# Patient Record
Sex: MALE | Race: WHITE | NOT HISPANIC OR LATINO | Employment: OTHER | URBAN - METROPOLITAN AREA
[De-identification: names, ages, dates, MRNs, and addresses within clinical notes are randomized per-mention and may not be internally consistent; named-entity substitution may affect disease eponyms.]

---

## 2017-02-09 ENCOUNTER — CONVERSION ENCOUNTER (OUTPATIENT)
Dept: FAMILY MEDICINE CLINIC | Facility: CLINIC | Age: 72
End: 2017-02-09

## 2017-02-20 ENCOUNTER — CONVERSION ENCOUNTER (OUTPATIENT)
Dept: FAMILY MEDICINE CLINIC | Facility: CLINIC | Age: 72
End: 2017-02-20

## 2017-03-21 ENCOUNTER — HOSPITAL ENCOUNTER (OUTPATIENT)
Dept: CARDIOLOGY | Facility: HOSPITAL | Age: 72
Discharge: HOME | End: 2017-03-21
Attending: INTERNAL MEDICINE | Admitting: INTERNAL MEDICINE

## 2017-08-29 ENCOUNTER — HOSPITAL ENCOUNTER (OUTPATIENT)
Dept: MAMMOGRAPHY | Facility: HOSPITAL | Age: 72
Discharge: HOME | End: 2017-08-29
Attending: INTERNAL MEDICINE | Admitting: INTERNAL MEDICINE

## 2017-11-13 ENCOUNTER — HOSPITAL ENCOUNTER (OUTPATIENT)
Dept: LAB | Facility: HOSPITAL | Age: 72
Discharge: HOME | End: 2017-11-13
Attending: INTERNAL MEDICINE | Admitting: INTERNAL MEDICINE

## 2017-11-13 LAB
ALBUMIN SERPL-MCNC: 4.9 G/DL (ref 3.5–4.8)
ALBUMIN/GLOB SERPL: 2.1 {RATIO} (ref 1–1.7)
ALP SERPL-CCNC: 44 IU/L (ref 32–91)
ALT SERPL-CCNC: 30 IU/L (ref 17–63)
ANION GAP SERPL CALC-SCNC: 15.9 MMOL/L (ref 10–20)
AST SERPL-CCNC: 37 IU/L (ref 15–41)
BASOPHILS # BLD AUTO: 0.1 10*3/UL (ref 0–0.2)
BASOPHILS NFR BLD AUTO: 1 % (ref 0–2)
BILIRUB SERPL-MCNC: 0.9 MG/DL (ref 0.3–1.2)
BUN SERPL-MCNC: 12 MG/DL (ref 8–20)
BUN/CREAT SERPL: 9.2 (ref 6.2–20.3)
CALCIUM SERPL-MCNC: 9.9 MG/DL (ref 8.9–10.3)
CHLORIDE SERPL-SCNC: 102 MMOL/L (ref 101–111)
CONV CO2: 28 MMOL/L (ref 22–32)
CONV TOTAL PROTEIN: 7.2 G/DL (ref 6.1–7.9)
CREAT UR-MCNC: 1.3 MG/DL (ref 0.7–1.2)
DIFFERENTIAL METHOD BLD: (no result)
EOSINOPHIL # BLD AUTO: 0.1 10*3/UL (ref 0–0.3)
EOSINOPHIL # BLD AUTO: 1 % (ref 0–3)
ERYTHROCYTE [DISTWIDTH] IN BLOOD BY AUTOMATED COUNT: 13.9 % (ref 11.5–14.5)
GLOBULIN UR ELPH-MCNC: 2.3 G/DL (ref 2.5–3.8)
GLUCOSE SERPL-MCNC: 118 MG/DL (ref 65–99)
HCT VFR BLD AUTO: 41.8 % (ref 40–54)
HGB BLD-MCNC: 14.2 G/DL (ref 14–18)
LYMPHOCYTES # BLD AUTO: 3.6 10*3/UL (ref 0.8–4.8)
LYMPHOCYTES NFR BLD AUTO: 37 % (ref 18–42)
MAGNESIUM SERPL-MCNC: 2.2 MG/DL (ref 1.8–2.5)
MCH RBC QN AUTO: 33.2 PG (ref 26–32)
MCHC RBC AUTO-ENTMCNC: 33.9 G/DL (ref 32–36)
MCV RBC AUTO: 98 FL (ref 80–94)
MONOCYTES # BLD AUTO: 0.5 10*3/UL (ref 0.1–1.3)
MONOCYTES NFR BLD AUTO: 5 % (ref 2–11)
NEUTROPHILS # BLD AUTO: 5.5 10*3/UL (ref 2.3–8.6)
NEUTROPHILS NFR BLD AUTO: 56 % (ref 50–75)
NRBC BLD AUTO-RTO: 0 /100{WBCS}
NRBC/RBC NFR BLD MANUAL: 0 10*3/UL
PLATELET # BLD AUTO: 360 10*3/UL (ref 150–450)
PMV BLD AUTO: 8.4 FL (ref 7.4–10.4)
POTASSIUM SERPL-SCNC: 4.9 MMOL/L (ref 3.6–5.1)
RBC # BLD AUTO: 4.27 10*6/UL (ref 4.6–6)
SODIUM SERPL-SCNC: 141 MMOL/L (ref 136–144)
TSH SERPL-ACNC: 1.52 UIU/ML (ref 0.34–5.6)
WBC # BLD AUTO: 9.8 10*3/UL (ref 4.5–11.5)

## 2018-03-08 ENCOUNTER — HOSPITAL ENCOUNTER (OUTPATIENT)
Dept: LAB | Facility: HOSPITAL | Age: 73
Discharge: HOME | End: 2018-03-08
Attending: INTERNAL MEDICINE | Admitting: INTERNAL MEDICINE

## 2018-05-31 ENCOUNTER — HOSPITAL ENCOUNTER (OUTPATIENT)
Dept: GENERAL RADIOLOGY | Facility: HOSPITAL | Age: 73
Discharge: HOME | End: 2018-05-31
Attending: INTERNAL MEDICINE | Admitting: INTERNAL MEDICINE

## 2018-05-31 ENCOUNTER — HOSPITAL ENCOUNTER (OUTPATIENT)
Dept: LAB | Facility: HOSPITAL | Age: 73
Discharge: HOME | End: 2018-05-31
Attending: INTERNAL MEDICINE | Admitting: INTERNAL MEDICINE

## 2018-05-31 LAB
ALBUMIN SERPL-MCNC: 4 G/DL (ref 3.5–4.8)
ALBUMIN/GLOB SERPL: 1.7 {RATIO} (ref 1–1.7)
ALP SERPL-CCNC: 50 IU/L (ref 32–91)
ALT SERPL-CCNC: 22 IU/L (ref 17–63)
ANION GAP SERPL CALC-SCNC: 12.1 MMOL/L (ref 10–20)
AST SERPL-CCNC: 25 IU/L (ref 15–41)
BILIRUB SERPL-MCNC: 0.6 MG/DL (ref 0.3–1.2)
BUN SERPL-MCNC: 10 MG/DL (ref 8–20)
BUN/CREAT SERPL: 8.3 (ref 6.2–20.3)
CALCIUM SERPL-MCNC: 9 MG/DL (ref 8.9–10.3)
CHLORIDE SERPL-SCNC: 107 MMOL/L (ref 101–111)
CONV CO2: 25 MMOL/L (ref 22–32)
CONV MACROCYTES IN BLOOD BY LIGHT MICROSCOPY: SLIGHT
CONV TOTAL PROTEIN: 6.4 G/DL (ref 6.1–7.9)
CREAT UR-MCNC: 1.2 MG/DL (ref 0.7–1.2)
CRP SERPL-MCNC: 0.24 MG/DL (ref 0–0.7)
DIFFERENTIAL METHOD BLD: (no result)
EOSINOPHIL # BLD AUTO: 0.1 10*3/UL (ref 0–0.3)
EOSINOPHIL # BLD AUTO: 1 % (ref 0–3)
ERYTHROCYTE [DISTWIDTH] IN BLOOD BY AUTOMATED COUNT: 15.8 % (ref 11.5–14.5)
ERYTHROCYTE [SEDIMENTATION RATE] IN BLOOD BY WESTERGREN METHOD: 20 MM/HR (ref 0–20)
GLOBULIN UR ELPH-MCNC: 2.4 G/DL (ref 2.5–3.8)
GLUCOSE SERPL-MCNC: 128 MG/DL (ref 65–99)
HCT VFR BLD AUTO: 39.2 % (ref 40–54)
HGB BLD-MCNC: 12.8 G/DL (ref 14–18)
LYMPHOCYTES # BLD AUTO: 3.2 10*3/UL (ref 0.8–4.8)
LYMPHOCYTES NFR BLD AUTO: 37 % (ref 18–42)
MCH RBC QN AUTO: 32.8 PG (ref 26–32)
MCHC RBC AUTO-ENTMCNC: 32.7 G/DL (ref 32–36)
MCV RBC AUTO: 100.2 FL (ref 80–94)
MONOCYTES # BLD AUTO: 0.3 10*3/UL (ref 0.1–1.3)
MONOCYTES NFR BLD AUTO: 4 % (ref 2–11)
NEUTROPHILS # BLD AUTO: 5.1 10*3/UL (ref 2.3–8.6)
NEUTROPHILS NFR BLD AUTO: 58 % (ref 50–75)
PLATELET # BLD AUTO: 318 10*3/UL (ref 150–450)
PMV BLD AUTO: 8.5 FL (ref 7.4–10.4)
POTASSIUM SERPL-SCNC: 4.1 MMOL/L (ref 3.6–5.1)
RBC # BLD AUTO: 3.91 10*6/UL (ref 4.6–6)
SODIUM SERPL-SCNC: 140 MMOL/L (ref 136–144)
URATE SERPL-MCNC: 4.9 MG/DL (ref 4.8–8.7)
WBC # BLD AUTO: 8.7 10*3/UL (ref 4.5–11.5)

## 2018-09-11 ENCOUNTER — HOSPITAL ENCOUNTER (OUTPATIENT)
Dept: ULTRASOUND IMAGING | Facility: HOSPITAL | Age: 73
Discharge: HOME | End: 2018-09-11
Attending: INTERNAL MEDICINE | Admitting: INTERNAL MEDICINE

## 2018-09-11 LAB
ANION GAP SERPL CALC-SCNC: 13.1 MMOL/L (ref 10–20)
BILIRUB UR QL STRIP: NEGATIVE MG/DL
BUN SERPL-MCNC: 14 MG/DL (ref 8–20)
BUN/CREAT SERPL: 10 (ref 6.2–20.3)
CALCIUM SERPL-MCNC: 9.1 MG/DL (ref 8.9–10.3)
CASTS URNS QL MICRO: NORMAL /[LPF]
CHLORIDE SERPL-SCNC: 102 MMOL/L (ref 101–111)
COLOR UR: YELLOW
CONV BACTERIA IN URINE MICRO: NEGATIVE
CONV CLARITY OF URINE: CLEAR
CONV CO2: 28 MMOL/L (ref 22–32)
CONV HYALINE CASTS IN URINE MICRO: 0 /[LPF] (ref 0–5)
CONV PROTEIN IN URINE BY AUTOMATED TEST STRIP: NEGATIVE MG/DL
CONV SMALL ROUND CELLS: NORMAL /[HPF]
CONV UROBILINOGEN IN URINE BY AUTOMATED TEST STRIP: 0.2 MG/DL
CREAT 24H UR-MCNC: 36.4 MG/DL
CREAT UR-MCNC: 1.4 MG/DL (ref 0.7–1.2)
CULTURE INDICATED?: NORMAL
GLUCOSE SERPL-MCNC: 95 MG/DL (ref 65–99)
GLUCOSE UR QL: NEGATIVE MG/DL
HGB UR QL STRIP: NEGATIVE
KETONES UR QL STRIP: NEGATIVE MG/DL
LEUKOCYTE ESTERASE UR QL STRIP: NEGATIVE
NITRITE UR QL STRIP: NEGATIVE
PH UR STRIP.AUTO: 6.5 [PH] (ref 4.5–8)
POTASSIUM SERPL-SCNC: 3.1 MMOL/L (ref 3.6–5.1)
PROT UR-MCNC: 2 MG/DL
PROT/CREAT UR: 0.1 MG/MG (ref 0–22)
RBC #/AREA URNS HPF: 1 /[HPF] (ref 0–3)
SODIUM SERPL-SCNC: 140 MMOL/L (ref 136–144)
SP GR UR: 1.01 (ref 1–1.03)
SPERM URNS QL MICRO: NORMAL /[HPF]
SQUAMOUS SPT QL MICRO: 0 /[HPF] (ref 0–5)
UNIDENT CRYS URNS QL MICRO: NORMAL /[HPF]
WBC #/AREA URNS HPF: 1 /[HPF] (ref 0–5)
YEAST SPEC QL WET PREP: NORMAL /[HPF]

## 2018-09-13 ENCOUNTER — HOSPITAL ENCOUNTER (OUTPATIENT)
Dept: CARDIOLOGY | Facility: HOSPITAL | Age: 73
Discharge: HOME | End: 2018-09-13
Attending: INTERNAL MEDICINE | Admitting: INTERNAL MEDICINE

## 2018-09-14 LAB — PROT PATTERN SERPL IFE-IMP: NORMAL

## 2018-10-31 ENCOUNTER — HOSPITAL ENCOUNTER (OUTPATIENT)
Dept: LAB | Facility: HOSPITAL | Age: 73
Discharge: HOME | End: 2018-10-31
Attending: INTERNAL MEDICINE | Admitting: INTERNAL MEDICINE

## 2018-10-31 LAB
ANION GAP SERPL CALC-SCNC: 14.1 MMOL/L (ref 10–20)
BILIRUB UR QL STRIP: NEGATIVE MG/DL
BUN SERPL-MCNC: 20 MG/DL (ref 8–20)
BUN/CREAT SERPL: 13.3 (ref 6.2–20.3)
CALCIUM SERPL-MCNC: 9.1 MG/DL (ref 8.9–10.3)
CASTS URNS QL MICRO: NORMAL /[LPF]
CHLORIDE SERPL-SCNC: 101 MMOL/L (ref 101–111)
COLOR UR: YELLOW
CONV BACTERIA IN URINE MICRO: NEGATIVE
CONV CLARITY OF URINE: CLEAR
CONV CO2: 28 MMOL/L (ref 22–32)
CONV HYALINE CASTS IN URINE MICRO: 0 /[LPF] (ref 0–5)
CONV PROTEIN IN URINE BY AUTOMATED TEST STRIP: NEGATIVE MG/DL
CONV SMALL ROUND CELLS: NORMAL /[HPF]
CONV UROBILINOGEN IN URINE BY AUTOMATED TEST STRIP: 0.2 MG/DL
CREAT UR-MCNC: 1.5 MG/DL (ref 0.7–1.2)
CULTURE INDICATED?: NORMAL
GLUCOSE SERPL-MCNC: 136 MG/DL (ref 65–99)
GLUCOSE UR QL: NEGATIVE MG/DL
HGB UR QL STRIP: NEGATIVE
KETONES UR QL STRIP: NEGATIVE MG/DL
LEUKOCYTE ESTERASE UR QL STRIP: NEGATIVE
NITRITE UR QL STRIP: NEGATIVE
PH UR STRIP.AUTO: 6 [PH] (ref 4.5–8)
POTASSIUM SERPL-SCNC: 4.1 MMOL/L (ref 3.6–5.1)
RBC #/AREA URNS HPF: 0 /[HPF] (ref 0–3)
SODIUM SERPL-SCNC: 139 MMOL/L (ref 136–144)
SP GR UR: 1.01 (ref 1–1.03)
SPERM URNS QL MICRO: NORMAL /[HPF]
SQUAMOUS SPT QL MICRO: 0 /[HPF] (ref 0–5)
UNIDENT CRYS URNS QL MICRO: NORMAL /[HPF]
WBC #/AREA URNS HPF: 0 /[HPF] (ref 0–5)
YEAST SPEC QL WET PREP: NORMAL /[HPF]

## 2019-01-28 ENCOUNTER — HOSPITAL ENCOUNTER (OUTPATIENT)
Dept: CARDIOLOGY | Facility: HOSPITAL | Age: 74
Discharge: HOME | End: 2019-01-28
Attending: INTERNAL MEDICINE | Admitting: INTERNAL MEDICINE

## 2019-05-13 ENCOUNTER — HOSPITAL ENCOUNTER (OUTPATIENT)
Dept: LAB | Facility: HOSPITAL | Age: 74
Discharge: HOME | End: 2019-05-13
Attending: INTERNAL MEDICINE | Admitting: INTERNAL MEDICINE

## 2019-05-13 LAB
ANION GAP SERPL CALC-SCNC: 15.6 MMOL/L (ref 10–20)
BUN SERPL-MCNC: 16 MG/DL (ref 8–20)
BUN/CREAT SERPL: 11.4 (ref 6.2–20.3)
CALCIUM SERPL-MCNC: 8.9 MG/DL (ref 8.9–10.3)
CHLORIDE SERPL-SCNC: 99 MMOL/L (ref 101–111)
CONV CO2: 23 MMOL/L (ref 22–32)
CREAT UR-MCNC: 1.4 MG/DL (ref 0.7–1.2)
GLUCOSE SERPL-MCNC: 117 MG/DL (ref 65–99)
POTASSIUM SERPL-SCNC: 3.6 MMOL/L (ref 3.6–5.1)
SODIUM SERPL-SCNC: 134 MMOL/L (ref 136–144)

## 2019-05-24 ENCOUNTER — HOSPITAL ENCOUNTER (OUTPATIENT)
Dept: PREADMISSION TESTING | Facility: HOSPITAL | Age: 74
Discharge: HOME | End: 2019-05-24
Attending: INTERNAL MEDICINE | Admitting: INTERNAL MEDICINE

## 2019-05-24 ENCOUNTER — CONVERSION ENCOUNTER (OUTPATIENT)
Dept: URGENT CARE | Facility: CLINIC | Age: 74
End: 2019-05-24

## 2019-05-24 LAB
ALBUMIN SERPL-MCNC: 3.7 G/DL (ref 3.5–4.8)
ALBUMIN/GLOB SERPL: 1.3 {RATIO} (ref 1–1.7)
ALP SERPL-CCNC: 56 IU/L (ref 32–91)
ALT SERPL-CCNC: 20 IU/L (ref 17–63)
ANION GAP SERPL CALC-SCNC: 18.5 MMOL/L (ref 10–20)
AST SERPL-CCNC: 26 IU/L (ref 15–41)
BILIRUB SERPL-MCNC: 1 MG/DL (ref 0.3–1.2)
BUN SERPL-MCNC: 10 MG/DL (ref 8–20)
BUN/CREAT SERPL: 8.3 (ref 6.2–20.3)
CALCIUM SERPL-MCNC: 8.9 MG/DL (ref 8.9–10.3)
CHLORIDE SERPL-SCNC: 94 MMOL/L (ref 101–111)
CONV ABS OTHER: 0.1 10*3/UL
CONV ANISOCYTES: SLIGHT
CONV CO2: 24 MMOL/L (ref 22–32)
CONV TOTAL PROTEIN: 6.6 G/DL (ref 6.1–7.9)
CREAT UR-MCNC: 1.2 MG/DL (ref 0.7–1.2)
DIFFERENTIAL METHOD BLD: (no result)
ERYTHROCYTE [DISTWIDTH] IN BLOOD BY AUTOMATED COUNT: 14.7 % (ref 11.5–14.5)
GLOBULIN UR ELPH-MCNC: 2.9 G/DL (ref 2.5–3.8)
GLUCOSE SERPL-MCNC: 125 MG/DL (ref 65–99)
HCT VFR BLD AUTO: 32.9 % (ref 40–54)
HGB BLD-MCNC: 10.9 G/DL (ref 14–18)
IMMATURE CELLS: 1 %
INR PPP: 1.1
LYMPHOCYTES # BLD AUTO: 4.7 10*3/UL (ref 0.8–4.8)
LYMPHOCYTES NFR BLD AUTO: 47 % (ref 18–42)
MAGNESIUM SERPL-MCNC: 1.8 MG/DL (ref 1.8–2.5)
MCH RBC QN AUTO: 32.8 PG (ref 26–32)
MCHC RBC AUTO-ENTMCNC: 33.1 G/DL (ref 32–36)
MCV RBC AUTO: 99.1 FL (ref 80–94)
MONOCYTES # BLD AUTO: 0.3 10*3/UL (ref 0.1–1.3)
MONOCYTES NFR BLD AUTO: 3 % (ref 2–11)
NEUTROPHILS # BLD AUTO: 4.9 10*3/UL (ref 2.3–8.6)
NEUTROPHILS NFR BLD AUTO: 49 % (ref 50–75)
PLATELET # BLD AUTO: 397 10*3/UL (ref 150–450)
PMV BLD AUTO: 7.9 FL (ref 7.4–10.4)
POTASSIUM SERPL-SCNC: 3.5 MMOL/L (ref 3.6–5.1)
PROTHROMBIN TIME: 11 SEC (ref 9.6–11.7)
RBC # BLD AUTO: 3.32 10*6/UL (ref 4.6–6)
SODIUM SERPL-SCNC: 133 MMOL/L (ref 136–144)
TSH SERPL-ACNC: 5.74 UIU/ML (ref 0.34–5.6)
WBC # BLD AUTO: 10 10*3/UL (ref 4.5–11.5)

## 2019-06-03 ENCOUNTER — CONVERSION ENCOUNTER (OUTPATIENT)
Dept: FAMILY MEDICINE CLINIC | Facility: CLINIC | Age: 74
End: 2019-06-03

## 2019-06-03 VITALS
HEART RATE: 99 BPM | DIASTOLIC BLOOD PRESSURE: 83 MMHG | SYSTOLIC BLOOD PRESSURE: 127 MMHG | OXYGEN SATURATION: 99 % | HEART RATE: 70 BPM | BODY MASS INDEX: 27.26 KG/M2 | WEIGHT: 190 LBS | OXYGEN SATURATION: 95 % | BODY MASS INDEX: 28.05 KG/M2 | WEIGHT: 195.5 LBS | SYSTOLIC BLOOD PRESSURE: 146 MMHG | DIASTOLIC BLOOD PRESSURE: 73 MMHG

## 2019-06-04 VITALS
OXYGEN SATURATION: 92 % | BODY MASS INDEX: 27.49 KG/M2 | DIASTOLIC BLOOD PRESSURE: 65 MMHG | HEART RATE: 69 BPM | HEIGHT: 70 IN | WEIGHT: 192 LBS | RESPIRATION RATE: 18 BRPM | SYSTOLIC BLOOD PRESSURE: 129 MMHG

## 2019-06-04 VITALS
OXYGEN SATURATION: 95 % | BODY MASS INDEX: 28.12 KG/M2 | WEIGHT: 196.4 LBS | HEIGHT: 70 IN | SYSTOLIC BLOOD PRESSURE: 150 MMHG | RESPIRATION RATE: 20 BRPM | HEART RATE: 62 BPM | DIASTOLIC BLOOD PRESSURE: 81 MMHG

## 2019-06-06 NOTE — PROGRESS NOTES
Visit Type:  Follow-up Visit  Referring Provider:  Aysha  Primary Provider:  Bertha Wang MD      History of Present Illness:  Post pacemaker upgrade came for incision evaluation incision is clean and pacemaker function within normal limits   polypharmacy addressed   reducing  isosorbide to 60 mg a day  .  Nifedipine to be stopped   patient to continue on losartan 50 mg a day   amiodarone was recently stopped   patient feels much better      Vital Signs:    Patient Profile:    74 Years Old Male  Height:     70 inches (177.80 cm)  Weight:     192 pounds  BMI:        27.55     O2 Sat:     92 %  Pulse rate: 69 / minute  Resp:       18 per minute  BP Sittin / 65  (right arm)    Cuff size:  regular      Problems: Active problems were reviewed with the patient during this visit.  Medications: Medications were reviewed with the patient during this visit.  Allergies: Allergies were reviewed with the patient during this visit.  No Known Allergy.        Vitals Entered By: Kim Muñoz RN (Dedra  3, 2019 8:25 AM)    Active Medications (reviewed today):  LOSARTAN POTASSIUM 25 MG ORAL TABLET (LOSARTAN POTASSIUM)   NIFEDIPINE ER 30 MG ORAL TABLET EXTENDED RELEASE 24 HOUR (NIFEDIPINE)   OLMESARTAN MEDOXOMIL 20 MG ORAL TABLET (OLMESARTAN MEDOXOMIL) Take one (1) tablet by mouth daily.  PROCARDIA XL 30 MG ORAL TABLET EXTENDED RELEASE 24 HOUR (NIFEDIPINE) Take one (1) tablet by mouth daily.  METOPROLOL SUCCINATE ER 50 MG ORAL TABLET EXTENDED RELEASE 24 HOUR (METOPROLOL SUCCINATE) Take 1 tablet by mouth daily  FUROSEMIDE 40 MG ORAL TABLET (FUROSEMIDE) Take 1 tablet by mouth daily  AMIODARONE  MG ORAL TABLET (AMIODARONE HCL) Take one (1) tablet by mouth daily.  CLOPIDOGREL BISULFATE 75 MG ORAL TABLET (CLOPIDOGREL BISULFATE) One by mouth daily  KLOR-CON 10 10 MEQ ORAL TABLET EXTENDED RELEASE (POTASSIUM CHLORIDE) Take 1 tablet by mouth daily  COLCHICINE 0.6 MG ORAL TABLET (COLCHICINE) 1 TAB PO BID  ALLOPURINOL 300 MG ORAL  TABLET (ALLOPURINOL) Take 1 tablet by mouth daily  FLOMAX 0.4 MG ORAL CAPSULE (TAMSULOSIN HCL) Take 1 tablet by mouth daily at bedtime  VITAMIN C TABLET (ASCORBIC ACID TABS) 1000mg twice daily by mouth  VITAMIN B-6 100 MG ORAL TABLET (PYRIDOXINE HCL) Take one by mouth daily  B-12 500 MCG ORAL TABLET (CYANOCOBALAMIN) Take 1 tablet by mouth daily  ATORVASTATIN CALCIUM 20 MG ORAL TABLET (ATORVASTATIN CALCIUM) Take 1 tablet by mouth daily at bedtime  RANITIDINE  MG ORAL CAPSULE (RANITIDINE HCL) twice daily  ISOSORBIDE MONONITRATE  MG ORAL TABLET EXTENDED RELEASE 24 HOUR (ISOSORBIDE MONONITRATE) Take 1 tablet by mouth daily  RANEXA 1000 MG ORAL TABLET EXTENDED RELEASE 12 HOUR (RANOLAZINE) Take one (1) tablet by mouth twice a day  QUETIAPINE FUMARATE 300 MG ORAL TABLET (QUETIAPINE FUMARATE) Take 2 tablets by mouth daily  MIRTAZAPINE 30 MG ORAL TABLET (MIRTAZAPINE) Take one (1) tablet by mouth twice a day  NITROSTAT 0.4 MG SUBLINGUAL TABLET SUBLINGUAL (NITROGLYCERIN) as directed  GERITOL COMPLETE ORAL TABLET (IRON-VITAMINS) Take 1 tablet by mouth daily    Current Allergies (reviewed today):  No known allergies      Past Medical History:     Reviewed history from 05/24/2019 and no changes required:        Hypertension        Coronary Artery Disease: S/P CABG        Hyperlipidemia        paroxysmal atrial fibrillation        Valvular Heart Disease: S/P MVR         Leukemia with lymphoma         SSS: S/P Pacemaker implantation         Bronchitis         Myocardial Infarction: slight July 2016        prostate    Past Surgical History:     Reviewed history from 01/22/2019 and no changes required:        C A B G:        Mitral Valve Replacement: 12/2002        Pacemaker: Biotronik Dual chamber implanted 11/7/2011        Right arm surgery for nerve problem-2013        Prostate surgery : March 2016        Left breast biopsy: May 2016        Cardiac Cath: 7-25-16 at [facility] : 10-5-2018 at [facility]         LIDIA 11/2017        EP  study 11/2017        Social History:     Reviewed history from 05/24/2019 and no changes required:        Passive Smoke: N        Alcohol Use: Y        Drug Use: N        HIV/High Risk: N        Regular Exercise: Y        Hx Domestic Abuse: N        Ev[employer] Affecting Care: N        Alcohol Use: Y                positive flu vaccine 2018                Smoking History: quit in year 2000        Patient is a former smoker.        Risk Factors:     Smoked Tobacco Use:  Former smoker     Cigarettes:  Yes -- .5 pack(s) per day,    Pack-years:  15 years        Year started:  age 28        Year quit:  2000        Years Since Last Quit:  19  Smokeless Tobacco Use:  Never  Passive smoke exposure:  no  Drug use:  no  HIV high-risk behavior:  no  Caffeine use:  3 drinks per day  Alcohol use:  no  Exercise:  yes     Times per week:  7     Type of Exercise:  sit ups, push up - walk a mile daily  Seatbelt use:  100 %  Sun Exposure:  frequently    Family History Risk Factors:     Family History of MI in females < 65 years old:  no     Family History of MI in males < 55 years old:  no    Previous Tobacco Use: Signed On - 05/24/2019  Smoked Tobacco Use:  Former smoker     Cigarettes:  Yes -- .5 pack(s) per day,    Pack-years:  15 years        Year started:  age 28        Year quit:  2000        Years Since Last Quit:  19 years, 5 months, 2 days  Smokeless Tobacco Use:  Never  Passive smoke exposure:  no  Drug use:  no  HIV high-risk behavior:  no  Caffeine use:  3 drinks per day    Previous Alcohol Use: Signed On - 05/24/2019  Alcohol use:  no  Exercise:  yes     Times per week:  7     Type of Exercise:  sit ups, push up - walk a mile daily  Seatbelt use:  100 %  Sun Exposure:  frequently    Family History Risk Factors:     Family History of MI in females < 65 years old:  no     Family History of MI in males < 55 years old:  no    Colonoscopy History:     Date of Last Colonoscopy:  07/01/2011        Review of Systems   General: No  fatigue or tiredness, No change in weight   Eyes: No redness  Cardiovascular: No chest pain, no palpitations  Respiratory: No shortness of breath  Gastrointestinal: No nausea or vomiting, bleeding  Genitourinary: no hematuria or dysuria  Musculoskeletal: No arthralgia or myalgia  Skin: No rash  Neurologic: No numbness, tingling, syncope  Hematologic/Lymphatic: No abnormal bleeding        Blood Pressure:  Today's BP: 129/65 mm Hg            Medications Added to Medication List This Visit:  1)  Losartan Potassium 50 Mg Oral Tablet (Losartan potassium) .... Take one (1) tablet by mouth once a day  2)  Metoprolol Succinate Er 25 Mg Oral Tablet Extended Release 24 Hour (Metoprolol succinate) .... 1 tab daily.  3)  Furosemide 40 Mg Oral Tablet (Furosemide) .... Take 2 tablets by mouth daily  4)  Quetiapine Fumarate 300 Mg Oral Tablet (Quetiapine fumarate) .... Take 1/2 tablets by mouth daily  5)  Mirtazapine 30 Mg Oral Tablet (Mirtazapine) .... Take 1/2 tablet by mouth twice a day                  Medication Administration    Orders Added:  1)  Post-op Visit [30194]  ]      Electronically signed by Luis Nieto MD on 06/03/2019 at 12:01 PM  ________________________________________________________________________       Disclaimer: Converted Note message may not contain all data elements that existed in the [employer] source system. Please see [employer] for the original note details.

## 2019-06-06 NOTE — PROGRESS NOTES
Visit Type:  Acute Visit  Primary Care Provider:  Bertha Wang MD    Chief Complaint:  cough.    History of Present Illness:  cough and nata after mowing 6 acres of grass  says  cough withe tickle in the throat  eyes watery  and runny nose  no allergy meds  concerned       Vital Signs:    Patient Profile:    74 Years Old Male  Height:     70 inches (177.80 cm)  Weight:     196.4 pounds  BMI:        28.18     O2 Sat:     95 %  Temp:       98 degrees F oral  Pulse rate: 62 / minute  Resp:       20 per minute  BP Sittin / 81  (right arm)    Cuff size:  regular      Problems: Active problems were reviewed with the patient during this visit.  Medications: Medications were reviewed with the patient during this visit.  Allergies: Allergies were reviewed with the patient during this visit.  No Known Allergy.  No Known Drug Allergy.        Vitals Entered By: Lisa Chester (May 24, 2019 9:12 AM)    Active Medications (reviewed today):  OLMESARTAN MEDOXOMIL 20 MG ORAL TABLET (OLMESARTAN MEDOXOMIL) Take one (1) tablet by mouth daily.  PROCARDIA XL 30 MG ORAL TABLET EXTENDED RELEASE 24 HOUR (NIFEDIPINE) Take one (1) tablet by mouth daily.  METOPROLOL SUCCINATE ER 50 MG ORAL TABLET EXTENDED RELEASE 24 HOUR (METOPROLOL SUCCINATE) Take 1 tablet by mouth daily  FUROSEMIDE 40 MG ORAL TABLET (FUROSEMIDE) Take 1 tablet by mouth daily  AMIODARONE  MG ORAL TABLET (AMIODARONE HCL) Take one (1) tablet by mouth daily.  CLOPIDOGREL BISULFATE 75 MG ORAL TABLET (CLOPIDOGREL BISULFATE) One by mouth daily  KLOR-CON 10 10 MEQ ORAL TABLET EXTENDED RELEASE (POTASSIUM CHLORIDE) Take 1 tablet by mouth daily  COLCHICINE 0.6 MG ORAL TABLET (COLCHICINE) 1 TAB PO BID  ALLOPURINOL 300 MG ORAL TABLET (ALLOPURINOL) Take 1 tablet by mouth daily  FLOMAX 0.4 MG ORAL CAPSULE (TAMSULOSIN HCL) Take 1 tablet by mouth daily at bedtime  VITAMIN C TABLET (ASCORBIC ACID TABS) 1000mg twice daily by mouth  VITAMIN B-6 100 MG ORAL TABLET (PYRIDOXINE HCL) Take  one by mouth daily  B-12 500 MCG ORAL TABLET (CYANOCOBALAMIN) Take 1 tablet by mouth daily  ATORVASTATIN CALCIUM 20 MG ORAL TABLET (ATORVASTATIN CALCIUM) Take 1 tablet by mouth daily at bedtime  RANITIDINE  MG ORAL CAPSULE (RANITIDINE HCL) twice daily  ISOSORBIDE MONONITRATE  MG ORAL TABLET EXTENDED RELEASE 24 HOUR (ISOSORBIDE MONONITRATE) Take 1 tablet by mouth daily  RANEXA 1000 MG ORAL TABLET EXTENDED RELEASE 12 HOUR (RANOLAZINE) Take one (1) tablet by mouth twice a day  QUETIAPINE FUMARATE 300 MG ORAL TABLET (QUETIAPINE FUMARATE) Take 2 tablets by mouth daily  MIRTAZAPINE 30 MG ORAL TABLET (MIRTAZAPINE) Take one (1) tablet by mouth twice a day  NITROSTAT 0.4 MG SUBLINGUAL TABLET SUBLINGUAL (NITROGLYCERIN) as directed  GERITOL COMPLETE ORAL TABLET (IRON-VITAMINS) Take 1 tablet by mouth daily    Current Allergies (reviewed today):  No known allergies    Current Medications (including medications started today):   PREDNISONE 50 MG ORAL TABLET (PREDNISONE) 1 by mouth once a day  LOSARTAN POTASSIUM 25 MG ORAL TABLET (LOSARTAN POTASSIUM)   NIFEDIPINE ER 30 MG ORAL TABLET EXTENDED RELEASE 24 HOUR (NIFEDIPINE)   OLMESARTAN MEDOXOMIL 20 MG ORAL TABLET (OLMESARTAN MEDOXOMIL) Take one (1) tablet by mouth daily.  PROCARDIA XL 30 MG ORAL TABLET EXTENDED RELEASE 24 HOUR (NIFEDIPINE) Take one (1) tablet by mouth daily.  METOPROLOL SUCCINATE ER 50 MG ORAL TABLET EXTENDED RELEASE 24 HOUR (METOPROLOL SUCCINATE) Take 1 tablet by mouth daily  FUROSEMIDE 40 MG ORAL TABLET (FUROSEMIDE) Take 1 tablet by mouth daily  AMIODARONE  MG ORAL TABLET (AMIODARONE HCL) Take one (1) tablet by mouth daily.  CLOPIDOGREL BISULFATE 75 MG ORAL TABLET (CLOPIDOGREL BISULFATE) One by mouth daily  KLOR-CON 10 10 MEQ ORAL TABLET EXTENDED RELEASE (POTASSIUM CHLORIDE) Take 1 tablet by mouth daily  COLCHICINE 0.6 MG ORAL TABLET (COLCHICINE) 1 TAB PO BID  ALLOPURINOL 300 MG ORAL TABLET (ALLOPURINOL) Take 1 tablet by mouth daily  FLOMAX 0.4  MG ORAL CAPSULE (TAMSULOSIN HCL) Take 1 tablet by mouth daily at bedtime  VITAMIN C TABLET (ASCORBIC ACID TABS) 1000mg twice daily by mouth  VITAMIN B-6 100 MG ORAL TABLET (PYRIDOXINE HCL) Take one by mouth daily  B-12 500 MCG ORAL TABLET (CYANOCOBALAMIN) Take 1 tablet by mouth daily  ATORVASTATIN CALCIUM 20 MG ORAL TABLET (ATORVASTATIN CALCIUM) Take 1 tablet by mouth daily at bedtime  RANITIDINE  MG ORAL CAPSULE (RANITIDINE HCL) twice daily  ISOSORBIDE MONONITRATE  MG ORAL TABLET EXTENDED RELEASE 24 HOUR (ISOSORBIDE MONONITRATE) Take 1 tablet by mouth daily  RANEXA 1000 MG ORAL TABLET EXTENDED RELEASE 12 HOUR (RANOLAZINE) Take one (1) tablet by mouth twice a day  QUETIAPINE FUMARATE 300 MG ORAL TABLET (QUETIAPINE FUMARATE) Take 2 tablets by mouth daily  MIRTAZAPINE 30 MG ORAL TABLET (MIRTAZAPINE) Take one (1) tablet by mouth twice a day  NITROSTAT 0.4 MG SUBLINGUAL TABLET SUBLINGUAL (NITROGLYCERIN) as directed  GERITOL COMPLETE ORAL TABLET (IRON-VITAMINS) Take 1 tablet by mouth daily      Past Medical History:     Reviewed history from 07/29/2016 and no changes required:        Hypertension        Coronary Artery Disease: S/P CABG        Hyperlipidemia        paroxysmal atrial fibrillation        Valvular Heart Disease: S/P MVR         Leukemia with lymphoma         SSS: S/P Pacemaker implantation         Bronchitis         Myocardial Infarction: slight July 2016        prostate    Past Surgical History:     Reviewed history from 01/22/2019 and no changes required:        C CLARK B G:        Mitral Valve Replacement: 12/2002        Pacemaker: Biotronik Dual chamber implanted 11/7/2011        Right arm surgery for nerve problem-2013        Prostate surgery : March 2016        Left breast biopsy: May 2016        Cardiac Cath: 7-25-16 at [facility] : 10-5-2018 at [facility]         LIDIA 11/2017        EP study 11/2017    Family History Summary:      Reviewed history Last on 04/23/2019 and no changes  required:2019      General Comments - FH:   Heart Disease-Father  of massive MI -? early 60's   FH Breast Cancer-Mother   FH Lung Cancer: Brother  of small cell lung cancer May 2014      Social History:     Reviewed history from 2018 and no changes required:        Passive Smoke: N        Alcohol Use: Y        Drug Use: N        HIV/High Risk: N        Regular Exercise: Y        Hx Domestic Abuse: N        Presybeterian Affecting Care: N        Alcohol Use: Y                positive flu vaccine                 Smoking History: quit in year         Patient is a former smoker.        Risk Factors:     Smoked Tobacco Use:  Former smoker     Cigarettes:  Yes        Year quit:          Years Since Last Quit:  19  Passive smoke exposure:  no  Drug use:  no  Alcohol use:  no    Previous Tobacco Use: Signed On - 2019  Smoked Tobacco Use:  Former smoker     Cigarettes:  Yes -- .5 pack(s) per day,    Pack-years:  15 years        Year started:  age 28        Year quit:          Years Since Last Quit:  19 years, 4 months, 23 days  Smokeless Tobacco Use:  Never  Passive smoke exposure:  no  Drug use:  no  HIV high-risk behavior:  no  Caffeine use:  3 drinks per day    Previous Alcohol Use: Signed On - 2019  Alcohol use:  yes     Type:  beer- weekends  Exercise:  yes     Times per week:  7     Type of Exercise:  sit ups, push up - walk a mile daily  Seatbelt use:  100 %  Sun Exposure:  frequently    Family History Risk Factors:     Family History of MI in females < 65 years old:  no     Family History of MI in males < 55 years old:  no    Colonoscopy History:     Date of Last Colonoscopy:  2011      Review of Systems   General: Denies fevers, chills.   Eyes: Complains of see HPI.   Ears/Nose/Throat: Complains of nasal congestion, hoarseness. Denies earache.   Respiratory: Complains of cough. Denies excessive sputum.   Skin: Denies rash, itching.   Allergic/Immunologic: All other  systems reviewed and are negative         Physical Exam    General:      well developed, well nourished, in no acute distress.  congested   Head:      normocephalic and atraumatic.    Mouth:      poor dentition.        Blood Pressure:  Today's BP: 150/81 mm Hg            Impression & Recommendations:    Problem # 1:  ALLERGY, AIRBORNE (ICD-477.9) (CQE02-E23.89)  Assessment: New    Orders:  Ofc Vst, Est Level III (52346)      Medications Added to Medication List This Visit:  1)  Prednisone 50 Mg Oral Tablet (Prednisone) .... 1 by mouth once a day  2)  Losartan Potassium 25 Mg Oral Tablet (Losartan potassium)  3)  Nifedipine Er 30 Mg Oral Tablet Extended Release 24 Hour (Nifedipine)      Patient Instructions:  1)  Avoid allergens.  2)  Allegra, Claritin or Zyrtec for the allergies.  3)  Flonase 1 spray  q am in each nostril.  4)  Prednisone once a day for 3 days  5)  Follow up with Primary care Provider.  6)  Follow up care is your responsibility.  7)  Discussed at the end of the visit pertaining findings, results, treatment plan, prescriptions and follow up plan.  8)  Informed pt/caregiver to call this office with any questions or concerns. Importance of following up with PCP reiterated by nursing staff.  9)  Pt / caregiver agree to the plan and express understanding the instructions and care plan provided in the Urgent Care and the limitations of the Urgent Care       ]          Laceration/ Wound     Objective     cm  Assessment:     Plan:   Rx:   PREDNISONE 50 MG ORAL TABLET 1 by mouth once a day, LOSARTAN POTASSIUM 25 MG ORAL TABLET, NIFEDIPINE ER 30 MG ORAL TABLET EXTENDED RELEASE 24 HOUR.          Electronically signed by Bushra Olmstead MD on 05/24/2019 at 9:44 AM  ________________________________________________________________________       Disclaimer: Converted Note message may not contain all data elements that existed in the legacy source system. Please see [employer] for the original  note details.

## 2019-06-07 NOTE — PROCEDURES
AWA, FOLLOW UP      Imported By: Susan Johns 6/4/2019 1:47:38 PM    _____________________________________________________________________    External Attachment:      Type: Image      Comment:  External Document      Signed before import by Luis Nieto MD  Filed automatically on 06/04/2019 at 1:48 PM  ________________________________________________________________________       Disclaimer: Converted Note message may not contain all data elements that existed in the legacy source system. Please see [facility] for the original note details.

## 2019-06-24 ENCOUNTER — OFFICE VISIT (OUTPATIENT)
Dept: CARDIOLOGY | Facility: CLINIC | Age: 74
End: 2019-06-24

## 2019-06-24 ENCOUNTER — CLINICAL SUPPORT NO REQUIREMENTS (OUTPATIENT)
Dept: CARDIOLOGY | Facility: CLINIC | Age: 74
End: 2019-06-24

## 2019-06-24 DIAGNOSIS — Z95.0 PRESENCE OF BIVENTRICULAR CARDIAC PACEMAKER: Primary | ICD-10-CM

## 2019-06-24 PROBLEM — I50.9 CONGESTIVE HEART FAILURE (HCC): Status: ACTIVE | Noted: 2019-05-06

## 2019-06-24 PROBLEM — I48.92 ATRIAL FLUTTER (HCC): Status: ACTIVE | Noted: 2017-11-28

## 2019-06-24 PROCEDURE — 93281 PM DEVICE PROGR EVAL MULTI: CPT | Performed by: INTERNAL MEDICINE

## 2019-06-24 PROCEDURE — 99024 POSTOP FOLLOW-UP VISIT: CPT | Performed by: INTERNAL MEDICINE

## 2019-06-24 RX ORDER — ALBUTEROL SULFATE 90 UG/1
2 AEROSOL, METERED RESPIRATORY (INHALATION) AS NEEDED
Refills: 0 | COMMUNITY
Start: 2019-06-03 | End: 2019-11-20

## 2019-06-24 RX ORDER — ALLOPURINOL 300 MG/1
300 TABLET ORAL DAILY
COMMUNITY
Start: 2017-08-02

## 2019-06-24 RX ORDER — RANITIDINE 150 MG/1
150 TABLET ORAL
COMMUNITY
End: 2020-09-09

## 2019-06-24 RX ORDER — EZETIMIBE AND SIMVASTATIN 10; 80 MG/1; MG/1
1 TABLET ORAL DAILY
COMMUNITY
End: 2020-09-09

## 2019-06-24 RX ORDER — BENZONATATE 200 MG/1
1 CAPSULE ORAL AS NEEDED
Refills: 0 | COMMUNITY
Start: 2019-06-03 | End: 2019-11-20

## 2019-06-24 RX ORDER — MIRTAZAPINE 30 MG/1
15 TABLET, FILM COATED ORAL EVERY 12 HOURS
COMMUNITY
Start: 2019-06-03

## 2019-06-24 RX ORDER — QUETIAPINE FUMARATE 400 MG/1
400 TABLET, FILM COATED ORAL DAILY
COMMUNITY
End: 2020-09-09

## 2019-06-24 RX ORDER — OLMESARTAN MEDOXOMIL 20 MG/1
1 TABLET ORAL EVERY 24 HOURS
COMMUNITY
Start: 2019-04-23 | End: 2019-12-02

## 2019-06-24 RX ORDER — CLOPIDOGREL BISULFATE 75 MG/1
75 TABLET ORAL 2 TIMES DAILY
COMMUNITY

## 2019-06-24 RX ORDER — AMLODIPINE BESYLATE 2.5 MG/1
2.5 TABLET ORAL DAILY
COMMUNITY
End: 2019-12-02

## 2019-06-24 RX ORDER — ASCORBIC ACID
1 CRYSTALS ORAL DAILY
COMMUNITY

## 2019-06-24 RX ORDER — TAMSULOSIN HYDROCHLORIDE 0.4 MG/1
1 CAPSULE ORAL DAILY
COMMUNITY

## 2019-06-24 RX ORDER — B1/B2/B3/B5/B6/IRON/METH/CHOLN 2.5-18/15
1 LIQUID (ML) ORAL DAILY
COMMUNITY
Start: 2013-06-24 | End: 2021-05-24

## 2019-06-24 RX ORDER — PREDNISONE 50 MG/1
50 TABLET ORAL DAILY
Refills: 0 | COMMUNITY
Start: 2019-05-24 | End: 2019-11-20

## 2019-06-24 RX ORDER — RANOLAZINE 1000 MG/1
1000 TABLET, EXTENDED RELEASE ORAL
COMMUNITY
Start: 2015-10-21 | End: 2019-07-23 | Stop reason: SDUPTHER

## 2019-06-24 RX ORDER — LISINOPRIL 20 MG/1
0.5 TABLET ORAL DAILY
COMMUNITY
End: 2019-12-02

## 2019-06-24 RX ORDER — DABIGATRAN ETEXILATE 150 MG/1
1 CAPSULE ORAL DAILY
COMMUNITY
End: 2020-09-09

## 2019-06-24 RX ORDER — OMEPRAZOLE 40 MG/1
40 CAPSULE, DELAYED RELEASE ORAL DAILY
COMMUNITY
End: 2020-09-09

## 2019-06-24 RX ORDER — AMIODARONE HYDROCHLORIDE 200 MG/1
200 TABLET ORAL DAILY
COMMUNITY
End: 2019-12-02

## 2019-06-24 RX ORDER — ASPIRIN 81 MG/1
81 TABLET, CHEWABLE ORAL DAILY
COMMUNITY
End: 2021-07-16

## 2019-06-24 RX ORDER — ISOSORBIDE MONONITRATE 120 MG/1
60 TABLET, EXTENDED RELEASE ORAL DAILY
COMMUNITY

## 2019-06-24 RX ORDER — NITROGLYCERIN 0.4 MG/1
1 TABLET SUBLINGUAL AS NEEDED
COMMUNITY
Start: 2013-06-24

## 2019-06-24 RX ORDER — NIFEDIPINE 30 MG/1
1 TABLET, EXTENDED RELEASE ORAL DAILY
Refills: 0 | COMMUNITY
Start: 2019-04-28 | End: 2019-12-02

## 2019-06-24 RX ORDER — ASCORBIC ACID 1000 MG
2 TABLET, EXTENDED RELEASE ORAL DAILY
COMMUNITY

## 2019-06-24 RX ORDER — COLCHICINE 0.6 MG/1
1 TABLET ORAL DAILY
COMMUNITY
Start: 2017-08-02

## 2019-06-24 RX ORDER — FUROSEMIDE 40 MG/1
1 TABLET ORAL 2 TIMES DAILY
COMMUNITY
Start: 2018-05-30

## 2019-06-24 RX ORDER — LOSARTAN POTASSIUM 50 MG/1
1 TABLET ORAL EVERY 24 HOURS
COMMUNITY
Start: 2019-06-03 | End: 2019-06-29 | Stop reason: SDUPTHER

## 2019-06-24 RX ORDER — PYRIDOXINE HCL (VITAMIN B6) 100 MG
100 TABLET ORAL DAILY
COMMUNITY

## 2019-06-24 RX ORDER — POTASSIUM CHLORIDE 750 MG/1
1 TABLET, FILM COATED, EXTENDED RELEASE ORAL EVERY 24 HOURS
COMMUNITY
Start: 2018-03-02 | End: 2021-05-24

## 2019-06-24 RX ORDER — ATORVASTATIN CALCIUM 20 MG/1
20 TABLET, FILM COATED ORAL DAILY
COMMUNITY

## 2019-06-24 NOTE — PROGRESS NOTES
Has biventricular pacemaker in situ  Incision is clean and a superficial suture was noted which was sterilely removed  Biventricular pacemaker personally reprogrammed and the frenular pacing changed from LV port 1 to RV for optimization of battery life  Follow-up in 2 months

## 2019-06-24 NOTE — PROGRESS NOTES
Patient here for 1 month incision check s/p Biotronik BiV pacemaker upgrade on 5/28/19.  Pt's incision intact and healing well.  No signs or symptoms of infection notes.  Remaining suture removed by Dr. Nieto.  Pacemaker personally interrogated by Dr. Nieto.  Patient made aware he can return to normal activities.  Pt transmitting on home monitor.  Pt to follow up with Dr. Nieto in 2 months.  Pt verbalized understanding.

## 2019-06-25 ENCOUNTER — CLINICAL SUPPORT NO REQUIREMENTS (OUTPATIENT)
Dept: CARDIOLOGY | Facility: CLINIC | Age: 74
End: 2019-06-25

## 2019-06-25 DIAGNOSIS — Z95.0 CARDIAC PACEMAKER IN SITU: Primary | ICD-10-CM

## 2019-06-26 NOTE — PROGRESS NOTES
Unable to cancel appointment.  It is too early for patient to be charged.  Patient to be rescheduled.

## 2019-07-01 RX ORDER — LOSARTAN POTASSIUM 50 MG/1
TABLET ORAL
Qty: 90 TABLET | Refills: 3 | Status: ON HOLD | OUTPATIENT
Start: 2019-07-01 | End: 2021-06-22

## 2019-07-09 ENCOUNTER — TELEPHONE (OUTPATIENT)
Dept: CARDIOLOGY | Facility: CLINIC | Age: 74
End: 2019-07-09

## 2019-07-15 ENCOUNTER — TELEPHONE (OUTPATIENT)
Dept: CARDIOLOGY | Facility: CLINIC | Age: 74
End: 2019-07-15

## 2019-07-15 NOTE — TELEPHONE ENCOUNTER
Left message for patient to call Chet in [place] office to be put on pacemaker schedule this afternoon.

## 2019-07-18 ENCOUNTER — OFFICE VISIT (OUTPATIENT)
Dept: CARDIOLOGY | Facility: CLINIC | Age: 74
End: 2019-07-18

## 2019-07-18 VITALS
OXYGEN SATURATION: 99 % | SYSTOLIC BLOOD PRESSURE: 155 MMHG | DIASTOLIC BLOOD PRESSURE: 83 MMHG | RESPIRATION RATE: 18 BRPM | HEART RATE: 70 BPM

## 2019-07-18 DIAGNOSIS — I48.19 PERSISTENT ATRIAL FIBRILLATION (HCC): ICD-10-CM

## 2019-07-18 DIAGNOSIS — Z95.0 PRESENCE OF BIVENTRICULAR CARDIAC PACEMAKER: Primary | ICD-10-CM

## 2019-07-18 DIAGNOSIS — I10 ESSENTIAL HYPERTENSION: ICD-10-CM

## 2019-07-18 DIAGNOSIS — R00.2 PALPITATIONS: ICD-10-CM

## 2019-07-18 PROCEDURE — 99214 OFFICE O/P EST MOD 30 MIN: CPT | Performed by: INTERNAL MEDICINE

## 2019-07-18 PROCEDURE — 93281 PM DEVICE PROGR EVAL MULTI: CPT | Performed by: INTERNAL MEDICINE

## 2019-07-18 RX ORDER — RANOLAZINE 500 MG/1
500 TABLET, EXTENDED RELEASE ORAL 2 TIMES DAILY
Qty: 60 TABLET | Refills: 3 | Status: CANCELLED | OUTPATIENT
Start: 2019-07-18

## 2019-07-18 RX ORDER — RANOLAZINE 500 MG/1
500 TABLET, EXTENDED RELEASE ORAL 2 TIMES DAILY
Qty: 60 TABLET | Refills: 2 | Status: SHIPPED | OUTPATIENT
Start: 2019-07-18 | End: 2019-07-18 | Stop reason: SDUPTHER

## 2019-07-18 NOTE — PROGRESS NOTES
CC--atrial fibrillation, hypertension     74-year-old male patient having recurrent sustained atrial flutter and  atrial fibrillation Underwent LIDIA which showed appropriately functioning bioprosthetic mitral valve with trace mitral regurgitation and left atrial appendage ligation and atrial fibrillation with atrial flutter and was cardioverted and left on sotalol and  underwent ablation  several months ago-- recurrent atrial arrhythmias were noted and patient was placed on amiodarone nearly a near-- in the interim patient has developed recurrent class 3 systolic heart failure with EF of 45%-- continuous RV pacing with iatrogenic left bundle-branch block was noted-- mildly elevated tsh was noted and a repeat cardiac catheterization back in October of 2018 and left on medical management -- comorbidities include coronary artery disease, valvular heart disease, atrial fibrillation, and history of leukemia.   He is status post previous bioprosthetic mitral valve replacement as well as 2 vessel CABG. He has history of sick sinus syndrome and is status post permanent pacemaker implant.He has history of coronary artery disease status post PCI stenting and subsequently underwent EECP.   Patient underwent biventricular pacemaker upgrade which was recently reprogrammed because of diaphragmatic stimulation and comes back with symptoms of recurrent palpitations and diaphragmatic stimulation and rapid heart rate for evaluation and denies any chest pain or syncope and significant improvement of symptoms are noted after biventricular pacemaker upgrade     assessment plan     persistent atrial flutter-- driven by  paroxysmal atrial fibrillation with recurrence despite cardioversion and  amiodarone and failed sotalol  despite extensive ablation  Biventricular pacemaker in situ--- pacemaker personally reprogrammed from VVI CLS mode to VVI rate-responsive mode--LV pacing configuration changed from LV port to RV without any  diaphragmatic stim elation even with the max output pacing--patient was ambulated multiple times with different programming changes and after the last reprogramming complete lack of symptoms were noted--interrogation of the device attached to the chart   progressive class 3 systolic heart failure symptoms with EF of 45%--significant reduction of heart failure symptoms and back to functional class II after biventricular pacing   Hypertension controlled  Medications reviewed and follow-up appointments made        Past Medical History:   Diagnosis Date   • Bronchitis    • CAD (coronary artery disease)     S/P CABG   • Hyperlipidemia    • Hypertension    • Leukemia (CMS/HCC)     Lymphoma   • Myocardial infarction (CMS/HCC) 2016    slight   • Paroxysmal atrial fibrillation (CMS/HCC)    • Prostate CA (CMS/[facility])    • SSS (sick sinus syndrome) (CMS/[facility])     S/P Pacemaker implantation   • Valvular heart disease     S/P MVR     Past Surgical History:   Procedure Laterality Date   • ARM NERVE EXPLORATION Right     Arm Surgery for nerve problem   • BREAST BIOPSY Left 2016   • CARDIAC CATHETERIZATION  10/05/2018    @ [facility] ; 2016 @ [facility]   • CORONARY ARTERY BYPASS GRAFT     • EP STUDY  2017   • MITRAL VALVE REPLACEMENT  2002   • PACEMAKER IMPLANTATION  2011    Pacemaker: Biotronik Dual chamber    • PROSTATE SURGERY  2016   • LIDIA  2017     History reviewed. No pertinent family history.  Social History     Tobacco Use   • Smoking status: Former Smoker     Last attempt to quit: 2000     Years since quittin.5   • Smokeless tobacco: Never Used   Substance Use Topics   • Alcohol use: Yes   • Drug use: No       (Not in a hospital admission)  Allergies:  Patient has no known allergies.    Review of Systems   General: Negative for fatigue and tiredness  Eyes: No redness  Cardiovascular: No chest pain, no palpitations  Respiratory:   Negative  shortness of breath  Gastrointestinal: No nausea or vomiting,  bleeding  Genitourinary: no hematuria or dysuria  Musculoskeletal: No arthralgia or myalgia  Skin: No rash  Neurologic: No numbness, tingling, syncope  Hematologic/Lymphatic: No abnormal bleeding      Physical Exam  VITALS REVIEWED--heart rate is 70 paced, respiration 12 times a minute, patient is afebrile, blood pressure 120/74    General:      well developed, well nourished, in no acute distress.    Head:      normocephalic and atraumatic.    Eyes:      PERRL/EOM intact, conjunctiva and sclera clear with out nystagmus.    Neck:      no masses, thyromegaly,  trachea central with normal respiratory effort and PMI displaced laterally  Lungs:      clear bilaterally to auscultation.    Heart:     Paced rhythm and pacemaker site is clean with underlying atrial fibrillation without any murmurs gallops or rubs  Msk:      no deformity or scoliosis noted of thoracic or lumbar spine.    Pulses:      pulses normal in all 4 extremities.    Extremities:       no cyanosis or clubbing--trace left pedal edema and trace right pedal edema.    Neurologic:      no focal deficits.   alert oriented x3        Psych:      alert and cooperative; normal mood and affect; normal attention span and concentration.

## 2019-07-23 ENCOUNTER — OFFICE VISIT (OUTPATIENT)
Dept: CARDIOLOGY | Facility: CLINIC | Age: 74
End: 2019-07-23

## 2019-07-23 VITALS
OXYGEN SATURATION: 98 % | HEART RATE: 69 BPM | WEIGHT: 189.8 LBS | DIASTOLIC BLOOD PRESSURE: 78 MMHG | HEIGHT: 72 IN | BODY MASS INDEX: 25.71 KG/M2 | SYSTOLIC BLOOD PRESSURE: 145 MMHG

## 2019-07-23 DIAGNOSIS — E78.2 MIXED HYPERLIPIDEMIA: ICD-10-CM

## 2019-07-23 DIAGNOSIS — I48.19 PERSISTENT ATRIAL FIBRILLATION (HCC): ICD-10-CM

## 2019-07-23 DIAGNOSIS — I10 ESSENTIAL HYPERTENSION: ICD-10-CM

## 2019-07-23 DIAGNOSIS — I25.10 CORONARY ARTERY DISEASE INVOLVING NATIVE CORONARY ARTERY OF NATIVE HEART WITHOUT ANGINA PECTORIS: Primary | ICD-10-CM

## 2019-07-23 DIAGNOSIS — Z95.1 S/P CABG (CORONARY ARTERY BYPASS GRAFT): ICD-10-CM

## 2019-07-23 PROCEDURE — 99213 OFFICE O/P EST LOW 20 MIN: CPT | Performed by: INTERNAL MEDICINE

## 2019-07-23 PROCEDURE — 93010 ELECTROCARDIOGRAM REPORT: CPT | Performed by: INTERNAL MEDICINE

## 2019-07-23 RX ORDER — RANOLAZINE 1000 MG/1
1000 TABLET, EXTENDED RELEASE ORAL EVERY 12 HOURS SCHEDULED
Qty: 180 TABLET | Refills: 1 | Status: SHIPPED | OUTPATIENT
Start: 2019-07-23 | End: 2019-07-24 | Stop reason: SDUPTHER

## 2019-07-23 NOTE — PROGRESS NOTES
"Progress Notes        Visit Type:  Follow-up Visit- 3 month   Primary Care Provider:  Bertha Wang MD        History of Present Illness:         Deamario alberto Rubalcava     History of Present Illness:        [id number]  for  atrial fibrillation/atrial flutter, hypertension,  coronary artery disease status post previous CABG, valvular heart disease,  status post previous bioprosthetic mitral valve replacement.       Mr. Kevin Smith  is a very pleasant 73-year-old  gentleman well known to our service  with history coronary artery disease valvular heart disease atrial dysrhythmia. He underwent LIDIA which showed appropriately functioning bioprosthetic mitral valve   with trace mitral regurgitation and left atrial appendage ligation and atrial fibrillation with atrial flutter and and  underwent ablation   about 2 years ago.  His wife is concerned that he has been taken off of Pradaxa and I will discuss with Dr. MIR whether he should be back on anticoagulant therapy.     EKG today showed ventricular paced rhythm with a rate of 60 bpm QRS duration was 154 ms QTC was 481 ms and QRS axis was 58.  Underlying rhythm was atrial fibrillation/flutter.  No changes were noted as compared to previous EKGs.         A/P  1-   Atrial fibrillation/ atrial flutter.  His pacemaker   [facility] followed by  and he has been apparently taken off of Pradaxa.  I will look into it and talk to Dr. MIR to see if he is a candidate for anticoagulant therapy.  He is on Plavix at this time.  Thanks           Vitals:    07/23/19 1438   BP: 145/78   BP Location: Right arm   Pulse: 69   SpO2: 98%   Weight: 86.1 kg (189 lb 12.8 oz)   Height: 182.9 cm (72\")           Problems: Active problems were reviewed with the patient during this visit.  Medications: Medications were reviewed with the patient during this visit.  Allergies: Allergies were reviewed with the patient during this visit.  No Known Allergy.                   Past Medical History:     Reviewed history " from 07/29/2016 and no changes required:        Hypertension        Coronary Artery Disease: S/P CABG        Hyperlipidemia        paroxysmal atrial fibrillation        Valvular Heart Disease: S/P MVR         Leukemia with lymphoma         SSS: S/P Pacemaker implantation         Bronchitis         Myocardial Infarction: slight July 2016     Past Surgical History:     Reviewed history from 01/22/2019 and no changes required:        ROGELIO SIM G:        Mitral Valve Replacement: 12/2002        Pacemaker: Biotronik Dual chamber implanted 11/7/2011        Right arm surgery for nerve problem-2013        Prostate surgery : March 2016        Left breast biopsy: May 2016        Cardiac Cath: 7-25-16 at [facility] : 10-5-2018 at [facility]         LIDIA 11/2017        EP study 11/2017     Active Medications (reviewed today):  METOPROLOL SUCCINATE ER 50 MG ORAL TABLET EXTENDED RELEASE 24 HOUR (METOPROLOL SUCCINATE) Take 1 tablet by mouth daily  LOSARTAN POTASSIUM 25 MG ORAL TABLET (LOSARTAN POTASSIUM) Take once a day by mouth.  AMLODIPINE BESYLATE 5 MG ORAL TABLET (AMLODIPINE BESYLATE) Take 1/2 tablet by mouth daily  FUROSEMIDE 40 MG ORAL TABLET (FUROSEMIDE) Take 1 tablet by mouth daily  AMIODARONE  MG ORAL TABLET (AMIODARONE HCL) Take one (1) tablet by mouth daily.  CLOPIDOGREL BISULFATE 75 MG ORAL TABLET (CLOPIDOGREL BISULFATE) One by mouth daily  KLOR-CON 10 10 MEQ ORAL TABLET EXTENDED RELEASE (POTASSIUM CHLORIDE) Take 1 tablet by mouth daily  COLCHICINE 0.6 MG ORAL TABLET (COLCHICINE) 1 TAB PO BID  ALLOPURINOL 300 MG ORAL TABLET (ALLOPURINOL) Take 1 tablet by mouth daily  FLOMAX 0.4 MG ORAL CAPSULE (TAMSULOSIN HCL) Take 1 tablet by mouth daily at bedtime  VITAMIN C TABLET (ASCORBIC ACID TABS) 1000mg twice daily by mouth  VITAMIN B-6 100 MG ORAL TABLET (PYRIDOXINE HCL) Take one by mouth daily  B-12 500 MCG ORAL TABLET (CYANOCOBALAMIN) Take 1 tablet by mouth daily  ATORVASTATIN CALCIUM 20 MG ORAL TABLET (ATORVASTATIN CALCIUM) Take 1  tablet by mouth daily at bedtime  RANITIDINE  MG ORAL CAPSULE (RANITIDINE HCL) twice daily  ISOSORBIDE MONONITRATE  MG ORAL TABLET EXTENDED RELEASE 24 HOUR (ISOSORBIDE MONONITRATE) Take 1 tablet by mouth daily  RANEXA 1000 MG ORAL TABLET EXTENDED RELEASE 12 HOUR (RANOLAZINE) Take one (1) tablet by mouth twice a day  QUETIAPINE FUMARATE 300 MG ORAL TABLET (QUETIAPINE FUMARATE) Take 2 tablets by mouth daily  MIRTAZAPINE 30 MG ORAL TABLET (MIRTAZAPINE) Take one (1) tablet by mouth twice a day  NITROSTAT 0.4 MG SUBLINGUAL TABLET SUBLINGUAL (NITROGLYCERIN) as directed  GERITOL COMPLETE ORAL TABLET (IRON-VITAMINS) Take 1 tablet by mouth daily     Current Allergies (reviewed today):  No known allergies     Family History Summary:      Reviewed history Last on 2019 and no changes required:2019        General Comments - FH:  FH Heart Disease-Father  of massive MI -? early 60's   FH Breast Cancer-Mother   FH Lung Cancer: Brother  of small cell lung cancer May 2014        Social History:     Reviewed history from 2018 and no changes required:        Passive Smoke: N        Alcohol Use: Y        Drug Use: N        HIV/High Risk: N        Regular Exercise: Y        Hx Domestic Abuse: N        Yarsani Affecting Care: N        Alcohol Use: Y        positive flu vaccine                 Smoking History:        Patient is a former smoker.           Risk Factors:      Smoked Tobacco Use:  Former smoker     Cigarettes:  Yes -- .5 pack(s) per day,    Pack-years:  15 years        Year started:  age 28        Year quit:  2000        Years Since Last Quit:  19  Smokeless Tobacco Use:  Never  Passive smoke exposure:  no  Drug use:  no  HIV high-risk behavior:  no  Caffeine use:  3 drinks per day  Alcohol use:  yes     Type:  beer- weekends  Exercise:  yes     Times per week:  7     Type of Exercise:  sit ups, push up - walk a mile daily  Seatbelt use:  100 %  Sun Exposure:  frequently     Family  History Risk Factors:     Family History of MI in females < 65 years old:  no     Family History of MI in males < 55 years old:  no           Review of Systems   General:  complains of fatigue and malaise  Eyes: denies blurring, diplopia, irritation, discharge, vision loss, eye pain, photophobia  Cardiovascular:  recurrent angina pectoris,Valvular heart disease with bioprosthetic mitral valve replacement, coronary artery disease status post previous CABG.  The recurrent atrial fibrillation /flutter.  Respiratory:  shortness of breath on exertion.  Gastrointestinal: Denies nausea, vomiting, diarrhea, constipation, change in bowel habits, abdominal pain, melena, hematochezia, jaundice  Musculoskeletal: denies back pain, joint pain, joint swelling, muscle cramps, muscle weakness, stiffness, arthritis  Skin: denies rash, itching, dryness, suspicious lesions  Neurologic: denies transient paralysis, weakness, paresthesias, seizures, syncope, tremors, vertigo  Psychiatric: denies depression, anxiety, memory loss, mental disturbance, suicidal ideation, hallucinations, paranoia  Endocrine:   Slightly hypothyroid state with high tsh probably secondary to amiodarone  Hematologic/Lymphatic:  anemia.  Lymphoma and CLL being followed by Hematology     Physical examination reveals a well-built well-nourished gentleman no distress.  HEENT was normal JVP was normal no carotid bruits were audible chest auscultation revealed crisp prosthetic heart sounds.  No murmurs or gallops are audible.  Abdomen was benign with no abnormal pulsations or bruits.  Peripheral pulses were intact both upper upper lower extremities.  There is no edema.

## 2019-07-24 RX ORDER — RANOLAZINE 1000 MG/1
1000 TABLET, EXTENDED RELEASE ORAL EVERY 12 HOURS SCHEDULED
Qty: 180 TABLET | Refills: 1 | Status: SHIPPED | OUTPATIENT
Start: 2019-07-24

## 2019-08-26 ENCOUNTER — OFFICE VISIT (OUTPATIENT)
Dept: CARDIOLOGY | Facility: CLINIC | Age: 74
End: 2019-08-26

## 2019-08-26 VITALS
WEIGHT: 194 LBS | BODY MASS INDEX: 26.31 KG/M2 | OXYGEN SATURATION: 70 % | DIASTOLIC BLOOD PRESSURE: 76 MMHG | SYSTOLIC BLOOD PRESSURE: 136 MMHG | HEART RATE: 100 BPM

## 2019-08-26 DIAGNOSIS — I48.19 PERSISTENT ATRIAL FIBRILLATION (HCC): Primary | ICD-10-CM

## 2019-08-26 DIAGNOSIS — I10 ESSENTIAL HYPERTENSION: ICD-10-CM

## 2019-08-26 DIAGNOSIS — Z95.0 CARDIAC PACEMAKER IN SITU: ICD-10-CM

## 2019-08-26 DIAGNOSIS — R00.2 PALPITATIONS: ICD-10-CM

## 2019-08-26 DIAGNOSIS — Z95.0 PRESENCE OF BIVENTRICULAR CARDIAC PACEMAKER: ICD-10-CM

## 2019-08-26 PROCEDURE — 99024 POSTOP FOLLOW-UP VISIT: CPT | Performed by: INTERNAL MEDICINE

## 2019-08-26 PROCEDURE — 93281 PM DEVICE PROGR EVAL MULTI: CPT | Performed by: INTERNAL MEDICINE

## 2019-08-26 NOTE — PROGRESS NOTES
CC--atrial fibrillation, hypertension     74-year-old male patient having recurrent sustained atrial flutter and  atrial fibrillation Underwent LIDIA which showed appropriately functioning bioprosthetic mitral valve with trace mitral regurgitation and left atrial appendage ligation and atrial fibrillation with atrial flutter and was cardioverted and left on sotalol and  underwent ablation  several months ago-- recurrent atrial arrhythmias were noted and patient was placed on amiodarone nearly a near-- in the interim patient has developed recurrent class 3 systolic heart failure with EF of 45%-- continuous RV pacing with iatrogenic left bundle-branch block was noted-- mildly elevated tsh was noted and a repeat cardiac catheterization back in October of 2018 and left on medical management -- comorbidities include coronary artery disease, valvular heart disease, atrial fibrillation, and history of leukemia.   He is status post previous bioprosthetic mitral valve replacement as well as 2 vessel CABG. He has history of sick sinus syndrome and is status post permanent pacemaker implant.He has history of coronary artery disease status post PCI stenting and subsequently underwent EECP.   Patient underwent biventricular pacemaker upgrade which was recently reprogrammed because of diaphragmatic stimulation and comes back with symptoms of recurrent palpitations and diaphragmatic stimulation and rapid heart rate for evaluation and denies any chest pain or syncope and significant improvement of symptoms are noted after biventricular pacemaker upgrade--patient was reprogrammed with last visit and has intermittent palpitations suggestive of intermittent diaphragmatic stimulation since last visit     assessment plan     persistent atrial flutter-- driven by  paroxysmal atrial fibrillation with recurrence despite cardioversion and  amiodarone and failed sotalol  despite extensive ablation  Biventricular pacemaker in situ--- pacemaker  personally reprogrammed  without any diaphragmatic stim elation even with the max output pacing   progressive class 3 systolic heart failure symptoms with EF of 45%--significant reduction of heart failure symptoms and back to functional class II after biventricular pacing   Hypertension controlled  Medications reviewed and follow-up appointments made  Currently note patient's left atrial appendage is ligated and would not need anticoagulation--patient and family educated        Past Medical History:   Diagnosis Date   • Bronchitis    • CAD (coronary artery disease)     S/P CABG   • Hyperlipidemia    • Hypertension    • Leukemia (CMS/[facility])     Lymphoma   • Myocardial infarction (CMS/HCC) 2016    slight   • Paroxysmal atrial fibrillation (CMS/[facility])    • Prostate CA (CMS/[facility])    • SSS (sick sinus syndrome) (CMS/[facility])     S/P Pacemaker implantation   • Valvular heart disease     S/P MVR     Past Surgical History:   Procedure Laterality Date   • ARM NERVE EXPLORATION Right     Arm Surgery for nerve problem   • BREAST BIOPSY Left 2016   • CARDIAC CATHETERIZATION  10/05/2018    @ [facility] ; 2016 @ [facility]   • CORONARY ARTERY BYPASS GRAFT     • EP STUDY  2017   • MITRAL VALVE REPLACEMENT  2002   • PACEMAKER IMPLANTATION  2011    Pacemaker: Biotronik Dual chamber    • PROSTATE SURGERY  2016   • LIDIA  2017     Family History   Problem Relation Age of Onset   • Hypertension Father    • Heart attack Father    • Heart disease Father      Social History     Tobacco Use   • Smoking status: Former Smoker     Packs/day: 0.50     Types: Cigarettes     Last attempt to quit: 2000     Years since quittin.6   • Smokeless tobacco: Never Used   Substance Use Topics   • Alcohol use: Yes     Comment: beer occasionally    • Drug use: No       (Not in a hospital admission)  Allergies:  Patient has no known allergies.    Review of Systems   General: Negative for fatigue and tiredness  Eyes: No redness  Cardiovascular:  No chest pain, no palpitations  Respiratory:   Negative  shortness of breath  Gastrointestinal: No nausea or vomiting, bleeding  Genitourinary: no hematuria or dysuria  Musculoskeletal: No arthralgia or myalgia  Skin: No rash  Neurologic: No numbness, tingling, syncope  Hematologic/Lymphatic: No abnormal bleeding      Physical Exam  VITALS REVIEWED--heart rate is 70 paced, respiration 12 times a minute, patient is afebrile, blood pressure 136/76    General:      well developed, well nourished, in no acute distress.    Head:      normocephalic and atraumatic.    Eyes:      PERRL/EOM intact, conjunctiva and sclera clear with out nystagmus.    Neck:      no masses, thyromegaly,  trachea central with normal respiratory effort and PMI displaced laterally  Lungs:      clear bilaterally to auscultation.    Heart:     Paced rhythm and pacemaker site is clean with underlying atrial fibrillation without any murmurs gallops or rubs  Msk:      no deformity or scoliosis noted of thoracic or lumbar spine.    Pulses:      pulses normal in all 4 extremities.    Extremities:       no cyanosis or clubbing--trace left pedal edema and trace right pedal edema.    Neurologic:      no focal deficits.   alert oriented x3        Psych:      alert and cooperative; normal mood and affect; normal attention span and concentration.

## 2019-10-29 ENCOUNTER — OFFICE VISIT (OUTPATIENT)
Dept: CARDIOLOGY | Facility: CLINIC | Age: 74
End: 2019-10-29

## 2019-10-29 VITALS
HEART RATE: 74 BPM | HEIGHT: 72 IN | WEIGHT: 196.6 LBS | DIASTOLIC BLOOD PRESSURE: 71 MMHG | OXYGEN SATURATION: 98 % | SYSTOLIC BLOOD PRESSURE: 127 MMHG | BODY MASS INDEX: 26.63 KG/M2

## 2019-10-29 DIAGNOSIS — Z95.1 S/P CABG (CORONARY ARTERY BYPASS GRAFT): ICD-10-CM

## 2019-10-29 DIAGNOSIS — I25.810 CORONARY ARTERY DISEASE INVOLVING CORONARY BYPASS GRAFT OF NATIVE HEART WITHOUT ANGINA PECTORIS: Primary | ICD-10-CM

## 2019-10-29 DIAGNOSIS — I48.0 PAROXYSMAL ATRIAL FIBRILLATION (HCC): ICD-10-CM

## 2019-10-29 DIAGNOSIS — Z95.0 CARDIAC PACEMAKER IN SITU: ICD-10-CM

## 2019-10-29 PROCEDURE — 99213 OFFICE O/P EST LOW 20 MIN: CPT | Performed by: INTERNAL MEDICINE

## 2019-10-29 NOTE — PROGRESS NOTES
"Dear Bill     History of Present Illness:        [id number]  for  atrial fibrillation/atrial flutter, hypertension,  coronary artery disease status post previous CABG, valvular heart disease,  status post previous bioprosthetic mitral valve replacement.     Mr. Kevin Smith  is a very pleasant 74year-old  gentleman well known to our service  with history coronary artery disease valvular heart disease atrial dysrhythmia. He underwent LIDIA which showed appropriately functioning bioprosthetic mitral valve   with trace mitral regurgitation and left atrial appendage ligation and atrial fibrillation with atrial flutter and and  underwent ablation   about 2 years ago.      His cancer has come back and chemotherapy is again being planned through Dr. Wong's office.       /71   Pulse 74 Comment: regular  Ht 182.9 cm (72\")   Wt 89.2 kg (196 lb 9.6 oz)   SpO2 98%   BMI 26.66 kg/m²         A/P  1-   Atrial fibrillation/ atrial flutter.  His pacemaker   is being checked periodically through our pacemaker clinic and he has been seeing Dr. MIR as well.    2.  History of valvular heart disease status post previous bioprosthetic mitral valve replacement    3- Coronary artery disease status post CABG           Problems: Active problems were reviewed with the patient during this visit.  Medications: Medications were reviewed with the patient during this visit.  Allergies: Allergies were reviewed with the patient during this visit.  No Known Allergy.                    Past Medical History:     Reviewed history from 07/29/2016 and no changes required:        Hypertension        Coronary Artery Disease: S/P CABG        Hyperlipidemia        paroxysmal atrial fibrillation        Valvular Heart Disease: S/P MVR         Leukemia with lymphoma         SSS: S/P Pacemaker implantation         Bronchitis         Myocardial Infarction: slight July 2016     Past Surgical History:     Reviewed history from 01/22/2019 and no " changes required:        ROGELIO SIM G:        Mitral Valve Replacement: 12/2002        Pacemaker: Biotronik Dual chamber implanted 11/7/2011        Right arm surgery for nerve problem-2013        Prostate surgery : March 2016        Left breast biopsy: May 2016        Cardiac Cath: 7-25-16 at [facility] : 10-5-2018 at [facility]         LIDIA 11/2017        EP study 11/2017     Active Medications (reviewed today):  METOPROLOL SUCCINATE ER 50 MG ORAL TABLET EXTENDED RELEASE 24 HOUR (METOPROLOL SUCCINATE) Take 1 tablet by mouth daily  LOSARTAN POTASSIUM 25 MG ORAL TABLET (LOSARTAN POTASSIUM) Take once a day by mouth.  AMLODIPINE BESYLATE 5 MG ORAL TABLET (AMLODIPINE BESYLATE) Take 1/2 tablet by mouth daily  FUROSEMIDE 40 MG ORAL TABLET (FUROSEMIDE) Take 1 tablet by mouth daily  AMIODARONE  MG ORAL TABLET (AMIODARONE HCL) Take one (1) tablet by mouth daily.  CLOPIDOGREL BISULFATE 75 MG ORAL TABLET (CLOPIDOGREL BISULFATE) One by mouth daily  KLOR-CON 10 10 MEQ ORAL TABLET EXTENDED RELEASE (POTASSIUM CHLORIDE) Take 1 tablet by mouth daily  COLCHICINE 0.6 MG ORAL TABLET (COLCHICINE) 1 TAB PO BID  ALLOPURINOL 300 MG ORAL TABLET (ALLOPURINOL) Take 1 tablet by mouth daily  FLOMAX 0.4 MG ORAL CAPSULE (TAMSULOSIN HCL) Take 1 tablet by mouth daily at bedtime  VITAMIN C TABLET (ASCORBIC ACID TABS) 1000mg twice daily by mouth  VITAMIN B-6 100 MG ORAL TABLET (PYRIDOXINE HCL) Take one by mouth daily  B-12 500 MCG ORAL TABLET (CYANOCOBALAMIN) Take 1 tablet by mouth daily  ATORVASTATIN CALCIUM 20 MG ORAL TABLET (ATORVASTATIN CALCIUM) Take 1 tablet by mouth daily at bedtime  RANITIDINE  MG ORAL CAPSULE (RANITIDINE HCL) twice daily  ISOSORBIDE MONONITRATE  MG ORAL TABLET EXTENDED RELEASE 24 HOUR (ISOSORBIDE MONONITRATE) Take 1 tablet by mouth daily  RANEXA 1000 MG ORAL TABLET EXTENDED RELEASE 12 HOUR (RANOLAZINE) Take one (1) tablet by mouth twice a day  QUETIAPINE FUMARATE 300 MG ORAL TABLET (QUETIAPINE FUMARATE) Take 2 tablets by  mouth daily  MIRTAZAPINE 30 MG ORAL TABLET (MIRTAZAPINE) Take one (1) tablet by mouth twice a day  NITROSTAT 0.4 MG SUBLINGUAL TABLET SUBLINGUAL (NITROGLYCERIN) as directed  GERITOL COMPLETE ORAL TABLET (IRON-VITAMINS) Take 1 tablet by mouth daily     Current Allergies (reviewed today):  No known allergies     Family History Summary:      Reviewed history Last on 2019 and no changes required:2019        General Comments - FH:  FH Heart Disease-Father  of massive MI -? early 60's   FH Breast Cancer-Mother   FH Lung Cancer: Brother  of small cell lung cancer May 2014        Social History:     Reviewed history from 2018 and no changes required:        Passive Smoke: N        Alcohol Use: Y        Drug Use: N        HIV/High Risk: N        Regular Exercise: Y        Hx Domestic Abuse: N        Spiritism Affecting Care: N        Alcohol Use: Y        positive flu vaccine            Smoking History:        Patient is a former smoker.           Risk Factors:      Smoked Tobacco Use:  Former smoker     Cigarettes:  Yes -- .5 pack(s) per day,    Pack-years:  15 years        Year started:  age 28        Year quit:  2000        Years Since Last Quit:  19  Smokeless Tobacco Use:  Never  Passive smoke exposure:  no  Drug use:  no  HIV high-risk behavior:  no  Caffeine use:  3 drinks per day  Alcohol use:  yes     Type:  beer- weekends  Exercise:  yes     Times per week:  7     Type of Exercise:  sit ups, push up - walk a mile daily  Seatbelt use:  100 %  Sun Exposure:  frequently     Family History Risk Factors:     Family History of MI in females < 65 years old:  no     Family History of MI in males < 55 years old:  no           Review of Systems   General:  complains of fatigue and malaise  Eyes: denies blurring, diplopia, irritation, discharge, vision loss, eye pain, photophobia  Cardiovascular:  recurrent angina pectoris,Valvular heart disease with bioprosthetic mitral valve replacement,  coronary artery disease status post previous CABG.  The recurrent atrial fibrillation /flutter.  Respiratory:  shortness of breath on exertion.  Gastrointestinal: Denies nausea, vomiting, diarrhea, constipation, change in bowel habits, abdominal pain, melena, hematochezia, jaundice  Musculoskeletal: denies back pain, joint pain, joint swelling, muscle cramps, muscle weakness, stiffness, arthritis  Skin: denies rash, itching, dryness, suspicious lesions  Neurologic: denies transient paralysis, weakness, paresthesias, seizures, syncope, tremors, vertigo  Psychiatric: denies depression, anxiety, memory loss, mental disturbance, suicidal ideation, hallucinations, paranoia  Endocrine:   Slightly hypothyroid state with high tsh probably secondary to amiodarone  Hematologic/Lymphatic:  anemia.  Lymphoma and CLL being followed by Hematology     Physical examination reveals a well-built well-nourished gentleman no distress.  HEENT was normal JVP was normal no carotid bruits were audible chest auscultation revealed crisp prosthetic heart sounds.  No murmurs or gallops are audible.  Abdomen was benign with no abnormal pulsations or bruits.  Peripheral pulses were intact both upper upper lower extremities.  There is no edema.

## 2019-12-02 ENCOUNTER — OFFICE VISIT (OUTPATIENT)
Dept: CARDIOLOGY | Facility: CLINIC | Age: 74
End: 2019-12-02

## 2019-12-02 ENCOUNTER — CLINICAL SUPPORT NO REQUIREMENTS (OUTPATIENT)
Dept: CARDIOLOGY | Facility: CLINIC | Age: 74
End: 2019-12-02

## 2019-12-02 VITALS
HEART RATE: 73 BPM | WEIGHT: 192 LBS | DIASTOLIC BLOOD PRESSURE: 70 MMHG | SYSTOLIC BLOOD PRESSURE: 126 MMHG | BODY MASS INDEX: 26.04 KG/M2

## 2019-12-02 DIAGNOSIS — I50.22 CHRONIC SYSTOLIC CONGESTIVE HEART FAILURE (HCC): ICD-10-CM

## 2019-12-02 DIAGNOSIS — I48.4 ATYPICAL ATRIAL FLUTTER (HCC): ICD-10-CM

## 2019-12-02 DIAGNOSIS — I50.9 OTHER CONGESTIVE HEART FAILURE (HCC): Primary | ICD-10-CM

## 2019-12-02 DIAGNOSIS — I25.810 CORONARY ARTERY DISEASE INVOLVING CORONARY BYPASS GRAFT OF NATIVE HEART WITHOUT ANGINA PECTORIS: ICD-10-CM

## 2019-12-02 DIAGNOSIS — Z95.0 PRESENCE OF BIVENTRICULAR CARDIAC PACEMAKER: Primary | ICD-10-CM

## 2019-12-02 DIAGNOSIS — I48.19 PERSISTENT ATRIAL FIBRILLATION (HCC): ICD-10-CM

## 2019-12-02 DIAGNOSIS — I10 ESSENTIAL HYPERTENSION: ICD-10-CM

## 2019-12-02 PROCEDURE — 93000 ELECTROCARDIOGRAM COMPLETE: CPT | Performed by: INTERNAL MEDICINE

## 2019-12-02 PROCEDURE — 99214 OFFICE O/P EST MOD 30 MIN: CPT | Performed by: INTERNAL MEDICINE

## 2019-12-02 RX ORDER — METOPROLOL SUCCINATE 50 MG/1
50 TABLET, EXTENDED RELEASE ORAL DAILY
Qty: 30 TABLET | Refills: 11 | Status: ON HOLD | OUTPATIENT
Start: 2019-12-02 | End: 2021-06-22

## 2019-12-02 RX ORDER — TRAMADOL HYDROCHLORIDE 50 MG/1
1 TABLET ORAL TAKE AS DIRECTED
Refills: 0 | COMMUNITY
Start: 2019-11-14 | End: 2020-09-09

## 2019-12-02 RX ORDER — PROMETHAZINE HYDROCHLORIDE 25 MG/1
1 TABLET ORAL AS NEEDED
Refills: 0 | COMMUNITY
Start: 2019-11-04 | End: 2021-07-16

## 2019-12-02 RX ORDER — PREDNISONE 50 MG/1
1 TABLET ORAL TAKE AS DIRECTED
Refills: 0 | COMMUNITY
Start: 2019-11-24 | End: 2020-09-09

## 2019-12-02 RX ORDER — ONDANSETRON HYDROCHLORIDE 8 MG/1
1 TABLET, FILM COATED ORAL AS NEEDED
Refills: 0 | COMMUNITY
Start: 2019-11-04 | End: 2021-07-16

## 2019-12-02 NOTE — PROGRESS NOTES
CC--atrial fibrillation, hypertension     74-year-old male patient having recurrent sustained atrial flutter and  atrial fibrillation Underwent LIDIA which showed appropriately functioning bioprosthetic mitral valve with trace mitral regurgitation and left atrial appendage ligation and atrial fibrillation with atrial flutter and was cardioverted and left on sotalol and  underwent ablation  several months ago-- recurrent atrial arrhythmias were noted and patient was placed on amiodarone nearly a near-- in the interim patient has developed recurrent class 3 systolic heart failure with EF of 45%-- continuous RV pacing with iatrogenic left bundle-branch block was noted-- mildly elevated tsh was noted and a repeat cardiac catheterization back in October of 2018 and left on medical management -- comorbidities include coronary artery disease, valvular heart disease, atrial fibrillation, and history of leukemia.   He is status post previous bioprosthetic mitral valve replacement as well as 2 vessel CABG. He has history of sick sinus syndrome and is status post permanent pacemaker implant.He has history of coronary artery disease status post PCI stenting and subsequently underwent EECP.   Patient underwent biventricular pacemaker upgrade which was recently reprogrammed because of diaphragmatic stimulation and comes back with symptoms of recurrent palpitations and diaphragmatic stimulation and rapid heart rate for evaluation and denies any chest pain or syncope and significant improvement of symptoms are noted after biventricular pacemaker upgrade--patient was reprogrammed with last visit and did not experience any more diaphragmatic stimulation and is slightly confused with medications because [facility] --he remains in functional class II and feels remarkably well   Left ventricle pacing output reduced to 3 V for optimization of pacemaker battery life and reprogrammed personally performed     assessment plan      persistent atrial flutter-- driven by  paroxysmal atrial fibrillation with recurrence despite cardioversion and  amiodarone and failed sotalol  despite extensive ablation  Biventricular pacemaker in situ--- pacemaker personally reprogrammed  without any diaphragmatic stim elation even with the max output pacing   progressive class 3 systolic heart failure symptoms with EF of 45%--significant reduction of heart failure symptoms and back to functional class II after biventricular pacing   Hypertension controlled  Medications reviewed and follow-up appointments made  Currently note patient's left atrial appendage is ligated and would not need anticoagulation--patient and family educated  Stop amiodarone and amlodipine  Start Toprol-XL 50 mg once daily  Follow-up in 3 months        Past Medical History:   Diagnosis Date   • Bronchitis    • CAD (coronary artery disease)     S/P CABG   • Hyperlipidemia    • Hypertension    • Leukemia (CMS/HCC)     Lymphoma : non - [facility] - has CLL    • Myocardial infarction (CMS/HCC) 07/2016    slight   • Paroxysmal atrial fibrillation (CMS/HCC)    • Prostate CA (CMS/HCC)    • SSS (sick sinus syndrome) (CMS/HCC)     S/P Pacemaker implantation   • Valvular heart disease     S/P MVR     Past Surgical History:   Procedure Laterality Date   • ARM NERVE EXPLORATION Right 2013    Arm Surgery for nerve problem   • BREAST BIOPSY Left 05/2016   • CARDIAC CATHETERIZATION  10/05/2018    @ [facility] ; 07/25/2016 @ [facility]   • CORONARY ARTERY BYPASS GRAFT     • EP STUDY  11/2017   • MITRAL VALVE REPLACEMENT  12/2002   • PACEMAKER IMPLANTATION  11/07/2011    Pacemaker: Biotronik Dual chamber    • PROSTATE SURGERY  03/2016   • LIDIA  11/2017     Family History   Problem Relation Age of Onset   • Hypertension Father    • Heart attack Father    • Heart disease Father      Social History     Tobacco Use   • Smoking status: Former Smoker     Packs/day: 0.50     Types: Cigarettes     Last attempt to quit: 2000      Years since quittin.9   • Smokeless tobacco: Never Used   Substance Use Topics   • Alcohol use: Yes     Comment: beer occasionally    • Drug use: No       (Not in a hospital admission)  Allergies:  Patient has no known allergies.    Review of Systems   General: Negative for fatigue and tiredness  Eyes: No redness  Cardiovascular: No chest pain, no palpitations  Respiratory:   Negative  shortness of breath  Gastrointestinal: No nausea or vomiting, bleeding  Genitourinary: no hematuria or dysuria  Musculoskeletal: No arthralgia or myalgia  Skin: No rash  Neurologic: No numbness, tingling, syncope  Hematologic/Lymphatic: No abnormal bleeding      Physical Exam  VITALS REVIEWED--heart rate is 69 paced, respiration 12 times a minute, patient is afebrile, blood pressure 126/70  EKG shows biventricular paced rhythm with underlying atrial arrhythmia with a heart rate of 69 and no significant EKG changes compared to prior EKG and indication for EKG includes chronic systolic heart failure    General:      well developed, well nourished, in no acute distress.    Head:      normocephalic and atraumatic.    Eyes:      PERRL/EOM intact, conjunctiva and sclera clear with out nystagmus.    Neck:      no masses, thyromegaly,  trachea central with normal respiratory effort and PMI displaced laterally  Lungs:      clear bilaterally to auscultation.    Heart:     Paced rhythm and pacemaker site is clean with underlying atrial fibrillation without any murmurs gallops or rubs  Msk:      no deformity or scoliosis noted of thoracic or lumbar spine.    Pulses:      pulses normal in all 4 extremities.    Extremities:       no cyanosis or clubbing--trace left pedal edema and trace right pedal edema.    Neurologic:      no focal deficits.   alert oriented x3        Psych:      alert and cooperative; normal mood and affect; normal attention span and concentration.

## 2019-12-03 PROCEDURE — 93281 PM DEVICE PROGR EVAL MULTI: CPT | Performed by: INTERNAL MEDICINE

## 2019-12-12 ENCOUNTER — TELEPHONE (OUTPATIENT)
Dept: CARDIOLOGY | Facility: CLINIC | Age: 74
End: 2019-12-12

## 2019-12-12 NOTE — TELEPHONE ENCOUNTER
Spoke with pt regarding home monitor.  Pt stated he will check in to it and if we do not receive in next few days, we can call him regarding.

## 2020-01-14 ENCOUNTER — TELEPHONE (OUTPATIENT)
Dept: CARDIOLOGY | Facility: CLINIC | Age: 75
End: 2020-01-14

## 2020-01-14 NOTE — TELEPHONE ENCOUNTER
Pt returned call regarding home monitor.  Pt is scheduled for 3/2/2020 appt and will discuss home monitor then.

## 2020-05-18 ENCOUNTER — TRANSCRIBE ORDERS (OUTPATIENT)
Dept: LAB | Facility: HOSPITAL | Age: 75
End: 2020-05-18

## 2020-05-18 ENCOUNTER — LAB (OUTPATIENT)
Dept: LAB | Facility: HOSPITAL | Age: 75
End: 2020-05-18

## 2020-05-18 DIAGNOSIS — N18.2 CHRONIC KIDNEY DISEASE, STAGE II (MILD): ICD-10-CM

## 2020-05-18 DIAGNOSIS — E55.9 VITAMIN D DEFICIENCY: ICD-10-CM

## 2020-05-18 DIAGNOSIS — I10 HYPERTENSION, UNSPECIFIED TYPE: ICD-10-CM

## 2020-05-18 DIAGNOSIS — N18.2 CHRONIC KIDNEY DISEASE, STAGE II (MILD): Primary | ICD-10-CM

## 2020-05-18 LAB
25(OH)D3 SERPL-MCNC: 35.2 NG/ML (ref 30–100)
ALBUMIN SERPL-MCNC: 4.7 G/DL (ref 3.5–5.2)
ALBUMIN/GLOB SERPL: 2.2 G/DL
ALP SERPL-CCNC: 51 U/L (ref 39–117)
ALT SERPL W P-5'-P-CCNC: 17 U/L (ref 1–41)
ANION GAP SERPL CALCULATED.3IONS-SCNC: 11.5 MMOL/L (ref 5–15)
AST SERPL-CCNC: 22 U/L (ref 1–40)
BASOPHILS # BLD AUTO: 0.05 10*3/MM3 (ref 0–0.2)
BASOPHILS NFR BLD AUTO: 1 % (ref 0–1.5)
BILIRUB SERPL-MCNC: 0.9 MG/DL (ref 0.2–1.2)
BILIRUB UR QL STRIP: NEGATIVE
BUN BLD-MCNC: 17 MG/DL (ref 8–23)
BUN/CREAT SERPL: 15.9 (ref 7–25)
CALCIUM SPEC-SCNC: 9.7 MG/DL (ref 8.6–10.5)
CHLORIDE SERPL-SCNC: 99 MMOL/L (ref 98–107)
CLARITY UR: CLEAR
CO2 SERPL-SCNC: 28.5 MMOL/L (ref 22–29)
COLOR UR: YELLOW
CREAT BLD-MCNC: 1.07 MG/DL (ref 0.76–1.27)
CREAT UR-MCNC: 17.2 MG/DL
DEPRECATED RDW RBC AUTO: 46.9 FL (ref 37–54)
EOSINOPHIL # BLD AUTO: 0.17 10*3/MM3 (ref 0–0.4)
EOSINOPHIL NFR BLD AUTO: 3.5 % (ref 0.3–6.2)
ERYTHROCYTE [DISTWIDTH] IN BLOOD BY AUTOMATED COUNT: 13.9 % (ref 12.3–15.4)
GFR SERPL CREATININE-BSD FRML MDRD: 67 ML/MIN/1.73
GLOBULIN UR ELPH-MCNC: 2.1 GM/DL
GLUCOSE BLD-MCNC: 127 MG/DL (ref 65–99)
GLUCOSE UR STRIP-MCNC: NEGATIVE MG/DL
HCT VFR BLD AUTO: 28 % (ref 37.5–51)
HGB BLD-MCNC: 9.7 G/DL (ref 13–17.7)
HGB UR QL STRIP.AUTO: NEGATIVE
IMM GRANULOCYTES # BLD AUTO: 0.02 10*3/MM3 (ref 0–0.05)
IMM GRANULOCYTES NFR BLD AUTO: 0.4 % (ref 0–0.5)
KETONES UR QL STRIP: NEGATIVE
LEUKOCYTE ESTERASE UR QL STRIP.AUTO: NEGATIVE
LYMPHOCYTES # BLD AUTO: 0.62 10*3/MM3 (ref 0.7–3.1)
LYMPHOCYTES NFR BLD AUTO: 12.7 % (ref 19.6–45.3)
MCH RBC QN AUTO: 32.2 PG (ref 26.6–33)
MCHC RBC AUTO-ENTMCNC: 34.6 G/DL (ref 31.5–35.7)
MCV RBC AUTO: 93 FL (ref 79–97)
MONOCYTES # BLD AUTO: 0.64 10*3/MM3 (ref 0.1–0.9)
MONOCYTES NFR BLD AUTO: 13.1 % (ref 5–12)
NEUTROPHILS # BLD AUTO: 3.37 10*3/MM3 (ref 1.7–7)
NEUTROPHILS NFR BLD AUTO: 69.3 % (ref 42.7–76)
NITRITE UR QL STRIP: NEGATIVE
NRBC BLD AUTO-RTO: 0 /100 WBC (ref 0–0.2)
PH UR STRIP.AUTO: 7 [PH] (ref 5–8)
PHOSPHATE SERPL-MCNC: 3.8 MG/DL (ref 2.5–4.5)
PLATELET # BLD AUTO: 197 10*3/MM3 (ref 140–450)
PMV BLD AUTO: 10.9 FL (ref 6–12)
POTASSIUM BLD-SCNC: 3.9 MMOL/L (ref 3.5–5.2)
PROT SERPL-MCNC: 6.8 G/DL (ref 6–8.5)
PROT UR QL STRIP: NEGATIVE
PROT UR-MCNC: <4 MG/DL
PROT/CREAT UR: NORMAL MG/G{CREAT}
PTH-INTACT SERPL-MCNC: 49.5 PG/ML (ref 15–65)
RBC # BLD AUTO: 3.01 10*6/MM3 (ref 4.14–5.8)
SODIUM BLD-SCNC: 139 MMOL/L (ref 136–145)
SP GR UR STRIP: 1.01 (ref 1–1.03)
URATE SERPL-MCNC: 8.8 MG/DL (ref 3.4–7)
UROBILINOGEN UR QL STRIP: NORMAL
WBC NRBC COR # BLD: 4.87 10*3/MM3 (ref 3.4–10.8)

## 2020-05-18 PROCEDURE — 84550 ASSAY OF BLOOD/URIC ACID: CPT

## 2020-05-18 PROCEDURE — 83970 ASSAY OF PARATHORMONE: CPT

## 2020-05-18 PROCEDURE — 84156 ASSAY OF PROTEIN URINE: CPT

## 2020-05-18 PROCEDURE — 80053 COMPREHEN METABOLIC PANEL: CPT

## 2020-05-18 PROCEDURE — 36415 COLL VENOUS BLD VENIPUNCTURE: CPT

## 2020-05-18 PROCEDURE — 82306 VITAMIN D 25 HYDROXY: CPT

## 2020-05-18 PROCEDURE — 82570 ASSAY OF URINE CREATININE: CPT

## 2020-05-18 PROCEDURE — 81003 URINALYSIS AUTO W/O SCOPE: CPT

## 2020-05-18 PROCEDURE — 85025 COMPLETE CBC W/AUTO DIFF WBC: CPT

## 2020-05-18 PROCEDURE — 84100 ASSAY OF PHOSPHORUS: CPT

## 2020-08-19 ENCOUNTER — TELEPHONE (OUTPATIENT)
Dept: CARDIOLOGY | Facility: CLINIC | Age: 75
End: 2020-08-19

## 2021-05-17 ENCOUNTER — PREP FOR SURGERY (OUTPATIENT)
Dept: OTHER | Facility: HOSPITAL | Age: 76
End: 2021-05-17

## 2021-05-17 ENCOUNTER — OFFICE VISIT (OUTPATIENT)
Dept: CARDIOLOGY | Facility: CLINIC | Age: 76
End: 2021-05-17

## 2021-05-17 VITALS
WEIGHT: 195 LBS | SYSTOLIC BLOOD PRESSURE: 133 MMHG | BODY MASS INDEX: 26.45 KG/M2 | DIASTOLIC BLOOD PRESSURE: 72 MMHG | HEART RATE: 70 BPM

## 2021-05-17 DIAGNOSIS — I50.22 CHRONIC SYSTOLIC CONGESTIVE HEART FAILURE (HCC): ICD-10-CM

## 2021-05-17 DIAGNOSIS — I48.19 PERSISTENT ATRIAL FIBRILLATION (HCC): Primary | ICD-10-CM

## 2021-05-17 DIAGNOSIS — I48.4 ATYPICAL ATRIAL FLUTTER (HCC): ICD-10-CM

## 2021-05-17 DIAGNOSIS — Z95.0 PRESENCE OF BIVENTRICULAR CARDIAC PACEMAKER: ICD-10-CM

## 2021-05-17 DIAGNOSIS — I25.810 CORONARY ARTERY DISEASE INVOLVING CORONARY BYPASS GRAFT OF NATIVE HEART WITHOUT ANGINA PECTORIS: ICD-10-CM

## 2021-05-17 PROCEDURE — 93281 PM DEVICE PROGR EVAL MULTI: CPT | Performed by: INTERNAL MEDICINE

## 2021-05-17 PROCEDURE — 93000 ELECTROCARDIOGRAM COMPLETE: CPT | Performed by: INTERNAL MEDICINE

## 2021-05-17 PROCEDURE — 99214 OFFICE O/P EST MOD 30 MIN: CPT | Performed by: INTERNAL MEDICINE

## 2021-05-17 RX ORDER — SODIUM CHLORIDE 0.9 % (FLUSH) 0.9 %
3-10 SYRINGE (ML) INJECTION AS NEEDED
Status: CANCELLED | OUTPATIENT
Start: 2021-05-17

## 2021-05-17 RX ORDER — DILTIAZEM HYDROCHLORIDE 180 MG/1
180 CAPSULE, COATED, EXTENDED RELEASE ORAL DAILY
COMMUNITY
End: 2021-06-22 | Stop reason: HOSPADM

## 2021-05-17 RX ORDER — SODIUM CHLORIDE 0.9 % (FLUSH) 0.9 %
3 SYRINGE (ML) INJECTION EVERY 12 HOURS SCHEDULED
Status: CANCELLED | OUTPATIENT
Start: 2021-05-17

## 2021-05-17 NOTE — PROGRESS NOTES
CC--atrial fibrillation, hypertension     Sub--76-year-old male patient having recurrent sustained atrial flutter and  atrial fibrillation Underwent LIDIA which showed appropriately functioning bioprosthetic mitral valve with trace mitral regurgitation and left atrial appendage ligation and atrial fibrillation with atrial flutter and was cardioverted and left on sotalol and  underwent ablation  several months ago-- recurrent atrial arrhythmias were noted and patient was placed on amiodarone nearly a near-- in the interim patient has developed recurrent class 3 systolic heart failure with EF of 45%-- continuous RV pacing with iatrogenic left bundle-branch block was noted-- mildly elevated tsh was noted and a repeat cardiac catheterization back in October of 2018 and left on medical management -- comorbidities include coronary artery disease, valvular heart disease, atrial fibrillation, and history of leukemia.   He is status post previous bioprosthetic mitral valve replacement as well as 2 vessel CABG. He has history of sick sinus syndrome and is status post permanent pacemaker implant.He has history of coronary artery disease status post PCI stenting and subsequently underwent EECP.   Patient underwent biventricular pacemaker upgrade which was  reprogrammed because of diaphragmatic stimulation and comes back with symptoms of recurrent palpitations and  rapid heart rate for evaluation and denies any chest pain or syncope and significant improvement of symptoms are noted after biventricular pacemaker upgrade--patient was reprogrammed with last visit and did not experience any more diaphragmatic stimulation and is slightly confused with medications because [facility] --he remains in functional class II and feels remarkably well          Past Medical History:   Diagnosis Date   • Bronchitis    • CAD (coronary artery disease)     S/P CABG   • Hyperlipidemia    • Hypertension    • Leukemia (CMS/HCC)     Lymphoma :  non - [facility] - has CLL    • Myocardial infarction (CMS/HCC) 07/2016    slight   • Paroxysmal atrial fibrillation (CMS/HCC)    • Prostate CA (CMS/HCC)    • SSS (sick sinus syndrome) (CMS/HCC)     S/P Pacemaker implantation   • Valvular heart disease     S/P MVR     Past Surgical History:   Procedure Laterality Date   • ARM NERVE EXPLORATION Right 2013    Arm Surgery for nerve problem   • BREAST BIOPSY Left 05/2016   • CARDIAC CATHETERIZATION  10/05/2018    @ [facility] ; 07/25/2016 @ [facility]   • CARDIAC SURGERY     • CORONARY ARTERY BYPASS GRAFT     • EP STUDY  11/2017   • MITRAL VALVE REPLACEMENT  12/2002   • PACEMAKER IMPLANTATION  11/07/2011    Pacemaker: Biotronik Dual chamber    • PROSTATE SURGERY  03/2016   • LIDIA  11/2017       Review of Systems   General: Negative for fatigue and tiredness  Eyes: No redness  Cardiovascular: No chest pain, positive for palpitations        Physical Exam  VITALS REVIEWEDGeneral:      well developed, well nourished, in no acute distress.    Head:      normocephalic and atraumatic.    Eyes:      PERRL/EOM intact, conjunctiva and sclera clear with out nystagmus.    Neck:      no masses, thyromegaly,  trachea central with normal respiratory effort and PMI displaced laterally  Lungs:      clear bilaterally to auscultation.    Heart:     Paced rhythm and pacemaker site is clean with underlying atrial fibrillation without any murmurs gallops or rubs        ssessment plan     persistent atrial flutter-- driven by  paroxysmal atrial fibrillation with recurrence despite cardioversion and  amiodarone and failed sotalol  despite extensive ablation  Biventricular pacemaker in situ--- pacemaker personally reprogrammed  without any diaphragmatic stimulation --intermittent  rapidly conducting atrial fibrillation--AV node ablation to be scheduled for AV node ablation to optimize CRT pacing and avoid multiple medications for A. fib rate control   progressive class 3 systolic heart failure symptoms with EF  of 45%--significant reduction of heart failure symptoms and back to functional class II after biventricular pacing   Hypertension controlled  Medications reviewed and follow-up appointments made  Currently note patient's left atrial appendage is ligated and would not need anticoagulation  Orders placed for AV node ablation  Recent echo from [facility] revealed EF of 50% with moderate to severe pulmonary hypertension which is reviewed on our healthcare records  Latest  hemoglobin of 10.1 g with normal platelets    ECG 12 Lead    Date/Time: 5/17/2021 1:48 PM  Performed by: Luis Nieto MD  Authorized by: Luis Nieto MD   Comparison: compared with previous ECG   Similar to previous ECG  Rhythm: atrial fibrillation and paced  Rate: normal  Pacing: ventricular paced rhythm        Electronically signed by Luis Nieto MD, 05/17/21, 1:47 PM EDT.

## 2021-05-20 PROBLEM — Z95.0 PRESENCE OF BIVENTRICULAR CARDIAC PACEMAKER: Status: ACTIVE | Noted: 2021-05-20

## 2021-05-20 PROBLEM — I48.19 PERSISTENT ATRIAL FIBRILLATION (HCC): Status: ACTIVE | Noted: 2021-05-20

## 2021-05-20 PROBLEM — I50.22 CHRONIC SYSTOLIC CONGESTIVE HEART FAILURE (HCC): Status: ACTIVE | Noted: 2021-05-20

## 2021-05-24 ENCOUNTER — TRANSCRIBE ORDERS (OUTPATIENT)
Dept: ADMINISTRATIVE | Facility: HOSPITAL | Age: 76
End: 2021-05-24

## 2021-05-24 ENCOUNTER — LAB (OUTPATIENT)
Dept: LAB | Facility: HOSPITAL | Age: 76
End: 2021-05-24

## 2021-05-24 DIAGNOSIS — Z95.0 PRESENCE OF BIVENTRICULAR CARDIAC PACEMAKER: ICD-10-CM

## 2021-05-24 DIAGNOSIS — Z01.818 PREOP TESTING: Primary | ICD-10-CM

## 2021-05-24 DIAGNOSIS — Z01.818 PREOP TESTING: ICD-10-CM

## 2021-05-24 DIAGNOSIS — I48.19 PERSISTENT ATRIAL FIBRILLATION (HCC): ICD-10-CM

## 2021-05-24 DIAGNOSIS — I50.22 CHRONIC SYSTOLIC CONGESTIVE HEART FAILURE (HCC): ICD-10-CM

## 2021-05-24 LAB
ALBUMIN SERPL-MCNC: 4.1 G/DL (ref 3.5–5.2)
ALBUMIN/GLOB SERPL: 1.9 G/DL
ALP SERPL-CCNC: 68 U/L (ref 39–117)
ALT SERPL W P-5'-P-CCNC: 9 U/L (ref 1–41)
ANION GAP SERPL CALCULATED.3IONS-SCNC: 9.1 MMOL/L (ref 5–15)
AST SERPL-CCNC: 14 U/L (ref 1–40)
BASOPHILS # BLD MANUAL: 0.1 10*3/MM3 (ref 0–0.2)
BASOPHILS NFR BLD AUTO: 1 % (ref 0–1.5)
BILIRUB SERPL-MCNC: 0.7 MG/DL (ref 0–1.2)
BUN SERPL-MCNC: 18 MG/DL (ref 8–23)
BUN/CREAT SERPL: 13.4 (ref 7–25)
CALCIUM SPEC-SCNC: 9.3 MG/DL (ref 8.6–10.5)
CHLORIDE SERPL-SCNC: 99 MMOL/L (ref 98–107)
CO2 SERPL-SCNC: 27.9 MMOL/L (ref 22–29)
CREAT SERPL-MCNC: 1.34 MG/DL (ref 0.76–1.27)
DEPRECATED RDW RBC AUTO: 47.5 FL (ref 37–54)
EOSINOPHIL # BLD MANUAL: 0.1 10*3/MM3 (ref 0–0.4)
EOSINOPHIL NFR BLD MANUAL: 1 % (ref 0.3–6.2)
ERYTHROCYTE [DISTWIDTH] IN BLOOD BY AUTOMATED COUNT: 13.8 % (ref 12.3–15.4)
GFR SERPL CREATININE-BSD FRML MDRD: 52 ML/MIN/1.73
GLOBULIN UR ELPH-MCNC: 2.2 GM/DL
GLUCOSE SERPL-MCNC: 141 MG/DL (ref 65–99)
HCT VFR BLD AUTO: 27 % (ref 37.5–51)
HGB BLD-MCNC: 9.1 G/DL (ref 13–17.7)
LYMPHOCYTES # BLD MANUAL: 2.52 10*3/MM3 (ref 0.7–3.1)
LYMPHOCYTES NFR BLD MANUAL: 26.5 % (ref 19.6–45.3)
LYMPHOCYTES NFR BLD MANUAL: 5.1 % (ref 5–12)
MAGNESIUM SERPL-MCNC: 2.2 MG/DL (ref 1.6–2.4)
MCH RBC QN AUTO: 31.9 PG (ref 26.6–33)
MCHC RBC AUTO-ENTMCNC: 33.7 G/DL (ref 31.5–35.7)
MCV RBC AUTO: 94.7 FL (ref 79–97)
MONOCYTES # BLD AUTO: 0.49 10*3/MM3 (ref 0.1–0.9)
NEUTROPHILS # BLD AUTO: 6.31 10*3/MM3 (ref 1.7–7)
NEUTROPHILS NFR BLD MANUAL: 66.3 % (ref 42.7–76)
PLAT MORPH BLD: NORMAL
PLATELET # BLD AUTO: 334 10*3/MM3 (ref 140–450)
PMV BLD AUTO: 9.3 FL (ref 6–12)
POTASSIUM SERPL-SCNC: 3.8 MMOL/L (ref 3.5–5.2)
PROT SERPL-MCNC: 6.3 G/DL (ref 6–8.5)
RBC # BLD AUTO: 2.85 10*6/MM3 (ref 4.14–5.8)
RBC MORPH BLD: NORMAL
SODIUM SERPL-SCNC: 136 MMOL/L (ref 136–145)
WBC # BLD AUTO: 9.51 10*3/MM3 (ref 3.4–10.8)
WBC MORPH BLD: NORMAL

## 2021-05-24 PROCEDURE — C9803 HOPD COVID-19 SPEC COLLECT: HCPCS

## 2021-05-24 PROCEDURE — 83735 ASSAY OF MAGNESIUM: CPT

## 2021-05-24 PROCEDURE — U0004 COV-19 TEST NON-CDC HGH THRU: HCPCS

## 2021-05-24 PROCEDURE — 85025 COMPLETE CBC W/AUTO DIFF WBC: CPT

## 2021-05-24 PROCEDURE — 80053 COMPREHEN METABOLIC PANEL: CPT

## 2021-05-24 PROCEDURE — 36415 COLL VENOUS BLD VENIPUNCTURE: CPT

## 2021-05-24 PROCEDURE — U0005 INFEC AGEN DETEC AMPLI PROBE: HCPCS

## 2021-05-24 PROCEDURE — 85007 BL SMEAR W/DIFF WBC COUNT: CPT

## 2021-05-24 RX ORDER — FERROUS SULFATE 325(65) MG
325 TABLET ORAL
COMMUNITY

## 2021-05-25 ENCOUNTER — TELEPHONE (OUTPATIENT)
Dept: CARDIOLOGY | Facility: CLINIC | Age: 76
End: 2021-05-25

## 2021-05-25 LAB — SARS-COV-2 ORF1AB RESP QL NAA+PROBE: DETECTED

## 2021-05-25 NOTE — TELEPHONE ENCOUNTER
Spoke with Mr Smith regarding Covid test results.  Let him know that the procedure would not be done tomorrow.  Explained the risks of having Covid and doing the procedure.  Pt verbalized understanding.  Explained that the  would call to reschedule at a later time.

## 2021-05-25 NOTE — TELEPHONE ENCOUNTER
Wife returned call. Reviewed Covid resutls.  Wife states that the patient does not have any symptoms , had his vaccines in 3/2021.  States that she was Covid positive in January but patient was not tested but he did lose his taste and smell.  Told her that I would report this to Dr Nieto.

## 2021-06-01 ENCOUNTER — TELEPHONE (OUTPATIENT)
Dept: CARDIOLOGY | Facility: CLINIC | Age: 76
End: 2021-06-01

## 2021-06-02 ENCOUNTER — TELEPHONE (OUTPATIENT)
Dept: CARDIOLOGY | Facility: CLINIC | Age: 76
End: 2021-06-02

## 2021-06-02 NOTE — TELEPHONE ENCOUNTER
Pts wife called.  Says patients heart rate is racing all the time.  Accompanied by Soa.  Is upset that ablation was cancelled last week because pt is Covid positive.  I directed her to take him to the emergency room for care d/t sustained elevated heart rate and Soa.  I also explained for her to let staff know that he tested COVID positive last week.  She stated that she doesn't believe the test because he does not have any symptoms.  I explained that everyone has symptoms and that it was still important for her or him to let staff know he came back positive. Made Dr Nieto aware of situation.

## 2021-06-14 ENCOUNTER — TELEPHONE (OUTPATIENT)
Dept: CARDIOLOGY | Facility: CLINIC | Age: 76
End: 2021-06-14

## 2021-06-14 NOTE — TELEPHONE ENCOUNTER
Pt wife calling wanting ablation rescheduled.  Left  as message and let patients wife know to expect a phone call from the .

## 2021-06-14 NOTE — TELEPHONE ENCOUNTER
Omaira from Dr Nieto's called. Patient is adamant about getting ablation scheduled ASAP. Will be three weeks since the covid positive test on 6/17.

## 2021-06-21 ENCOUNTER — TRANSCRIBE ORDERS (OUTPATIENT)
Dept: LAB | Facility: HOSPITAL | Age: 76
End: 2021-06-21

## 2021-06-21 ENCOUNTER — LAB (OUTPATIENT)
Dept: LAB | Facility: HOSPITAL | Age: 76
End: 2021-06-21

## 2021-06-21 ENCOUNTER — ANESTHESIA EVENT (OUTPATIENT)
Dept: CARDIOLOGY | Facility: HOSPITAL | Age: 76
End: 2021-06-21

## 2021-06-21 DIAGNOSIS — D63.1 ANEMIA OF CHRONIC RENAL FAILURE, UNSPECIFIED CKD STAGE: ICD-10-CM

## 2021-06-21 DIAGNOSIS — N18.9 ANEMIA OF CHRONIC RENAL FAILURE, UNSPECIFIED CKD STAGE: Primary | ICD-10-CM

## 2021-06-21 DIAGNOSIS — N18.30 STAGE 3 CHRONIC KIDNEY DISEASE, UNSPECIFIED WHETHER STAGE 3A OR 3B CKD (HCC): ICD-10-CM

## 2021-06-21 DIAGNOSIS — D63.1 ANEMIA OF CHRONIC RENAL FAILURE, UNSPECIFIED CKD STAGE: Primary | ICD-10-CM

## 2021-06-21 DIAGNOSIS — E55.9 VITAMIN D DEFICIENCY, UNSPECIFIED: ICD-10-CM

## 2021-06-21 DIAGNOSIS — I10 PRIMARY HYPERTENSION: ICD-10-CM

## 2021-06-21 DIAGNOSIS — N18.9 ANEMIA OF CHRONIC RENAL FAILURE, UNSPECIFIED CKD STAGE: ICD-10-CM

## 2021-06-21 LAB
25(OH)D3 SERPL-MCNC: 38.7 NG/ML
ALBUMIN SERPL-MCNC: 3.8 G/DL (ref 3.5–5.2)
ALBUMIN/GLOB SERPL: 1.7 G/DL
ALP SERPL-CCNC: 59 U/L (ref 39–117)
ALT SERPL W P-5'-P-CCNC: 6 U/L (ref 1–41)
ANION GAP SERPL CALCULATED.3IONS-SCNC: 9.3 MMOL/L (ref 5–15)
ANISOCYTOSIS BLD QL: ABNORMAL
AST SERPL-CCNC: 5 U/L (ref 1–40)
BILIRUB SERPL-MCNC: 0.5 MG/DL (ref 0–1.2)
BUN SERPL-MCNC: 28 MG/DL (ref 8–23)
BUN/CREAT SERPL: 17 (ref 7–25)
CALCIUM SPEC-SCNC: 8.5 MG/DL (ref 8.6–10.5)
CALCIUM SPEC-SCNC: 8.8 MG/DL (ref 8.6–10.5)
CHLORIDE SERPL-SCNC: 99 MMOL/L (ref 98–107)
CO2 SERPL-SCNC: 27.7 MMOL/L (ref 22–29)
CREAT SERPL-MCNC: 1.65 MG/DL (ref 0.76–1.27)
CREAT UR-MCNC: 30.6 MG/DL
DEPRECATED RDW RBC AUTO: 46.3 FL (ref 37–54)
ERYTHROCYTE [DISTWIDTH] IN BLOOD BY AUTOMATED COUNT: 13.1 % (ref 12.3–15.4)
GFR SERPL CREATININE-BSD FRML MDRD: 41 ML/MIN/1.73
GLOBULIN UR ELPH-MCNC: 2.2 GM/DL
GLUCOSE SERPL-MCNC: 146 MG/DL (ref 65–99)
HCT VFR BLD AUTO: 28.8 % (ref 37.5–51)
HGB BLD-MCNC: 9.6 G/DL (ref 13–17.7)
LYMPHOCYTES # BLD MANUAL: 4.07 10*3/MM3 (ref 0.7–3.1)
LYMPHOCYTES NFR BLD MANUAL: 2 % (ref 5–12)
LYMPHOCYTES NFR BLD MANUAL: 30 % (ref 19.6–45.3)
MCH RBC QN AUTO: 32.2 PG (ref 26.6–33)
MCHC RBC AUTO-ENTMCNC: 33.3 G/DL (ref 31.5–35.7)
MCV RBC AUTO: 96.6 FL (ref 79–97)
MONOCYTES # BLD AUTO: 0.27 10*3/MM3 (ref 0.1–0.9)
NEUTROPHILS # BLD AUTO: 9.22 10*3/MM3 (ref 1.7–7)
NEUTROPHILS NFR BLD MANUAL: 68 % (ref 42.7–76)
PHOSPHATE SERPL-MCNC: 3.7 MG/DL (ref 2.5–4.5)
PLAT MORPH BLD: NORMAL
PLATELET # BLD AUTO: 326 10*3/MM3 (ref 140–450)
PMV BLD AUTO: 9.4 FL (ref 6–12)
POIKILOCYTOSIS BLD QL SMEAR: ABNORMAL
POTASSIUM SERPL-SCNC: 4.1 MMOL/L (ref 3.5–5.2)
PROT SERPL-MCNC: 6 G/DL (ref 6–8.5)
PROT UR-MCNC: <4 MG/DL
PROT/CREAT UR: NORMAL MG/G{CREAT}
PTH-INTACT SERPL-MCNC: 54.6 PG/ML (ref 15–65)
RBC # BLD AUTO: 2.98 10*6/MM3 (ref 4.14–5.8)
SODIUM SERPL-SCNC: 136 MMOL/L (ref 136–145)
WBC # BLD AUTO: 13.56 10*3/MM3 (ref 3.4–10.8)
WBC MORPH BLD: NORMAL

## 2021-06-21 PROCEDURE — 82570 ASSAY OF URINE CREATININE: CPT

## 2021-06-21 PROCEDURE — 82306 VITAMIN D 25 HYDROXY: CPT

## 2021-06-21 PROCEDURE — 85007 BL SMEAR W/DIFF WBC COUNT: CPT

## 2021-06-21 PROCEDURE — 84156 ASSAY OF PROTEIN URINE: CPT

## 2021-06-21 PROCEDURE — 84100 ASSAY OF PHOSPHORUS: CPT

## 2021-06-21 PROCEDURE — 36415 COLL VENOUS BLD VENIPUNCTURE: CPT

## 2021-06-21 PROCEDURE — 83970 ASSAY OF PARATHORMONE: CPT

## 2021-06-21 PROCEDURE — 85025 COMPLETE CBC W/AUTO DIFF WBC: CPT

## 2021-06-21 PROCEDURE — 80053 COMPREHEN METABOLIC PANEL: CPT

## 2021-06-22 ENCOUNTER — ANESTHESIA (OUTPATIENT)
Dept: CARDIOLOGY | Facility: HOSPITAL | Age: 76
End: 2021-06-22

## 2021-06-22 ENCOUNTER — HOSPITAL ENCOUNTER (OUTPATIENT)
Facility: HOSPITAL | Age: 76
Setting detail: HOSPITAL OUTPATIENT SURGERY
Discharge: HOME | End: 2021-06-22
Attending: INTERNAL MEDICINE | Admitting: INTERNAL MEDICINE

## 2021-06-22 VITALS — SYSTOLIC BLOOD PRESSURE: 106 MMHG | OXYGEN SATURATION: 99 % | HEART RATE: 70 BPM | DIASTOLIC BLOOD PRESSURE: 52 MMHG

## 2021-06-22 VITALS
SYSTOLIC BLOOD PRESSURE: 133 MMHG | OXYGEN SATURATION: 96 % | RESPIRATION RATE: 19 BRPM | TEMPERATURE: 97.5 F | HEART RATE: 70 BPM | WEIGHT: 192.68 LBS | HEIGHT: 69 IN | DIASTOLIC BLOOD PRESSURE: 63 MMHG | BODY MASS INDEX: 28.54 KG/M2

## 2021-06-22 DIAGNOSIS — I48.19 PERSISTENT ATRIAL FIBRILLATION (HCC): ICD-10-CM

## 2021-06-22 DIAGNOSIS — Z86.79 HX OF ATRIAL FIBRILLATION WITHOUT CURRENT MEDICATION: Primary | ICD-10-CM

## 2021-06-22 DIAGNOSIS — Z95.0 PRESENCE OF BIVENTRICULAR CARDIAC PACEMAKER: ICD-10-CM

## 2021-06-22 DIAGNOSIS — I50.22 CHRONIC SYSTOLIC CONGESTIVE HEART FAILURE (HCC): ICD-10-CM

## 2021-06-22 LAB
ACT BLD: 136 SECONDS (ref 89–137)
ACT BLD: 136 SECONDS (ref 89–137)
MAGNESIUM SERPL-MCNC: 2.3 MG/DL (ref 1.6–2.4)

## 2021-06-22 PROCEDURE — 85347 COAGULATION TIME ACTIVATED: CPT

## 2021-06-22 PROCEDURE — 25010000002 PROTAMINE SULFATE PER 10 MG: Performed by: NURSE ANESTHETIST, CERTIFIED REGISTERED

## 2021-06-22 PROCEDURE — C1732 CATH, EP, DIAG/ABL, 3D/VECT: HCPCS | Performed by: INTERNAL MEDICINE

## 2021-06-22 PROCEDURE — 93650 ICAR CATH ABLTJ AV NODE FUNC: CPT | Performed by: INTERNAL MEDICINE

## 2021-06-22 PROCEDURE — 25010000002 PROPOFOL 10 MG/ML EMULSION: Performed by: NURSE ANESTHETIST, CERTIFIED REGISTERED

## 2021-06-22 PROCEDURE — C1894 INTRO/SHEATH, NON-LASER: HCPCS | Performed by: INTERNAL MEDICINE

## 2021-06-22 PROCEDURE — 25010000002 FENTANYL CITRATE (PF) 100 MCG/2ML SOLUTION: Performed by: NURSE ANESTHETIST, CERTIFIED REGISTERED

## 2021-06-22 PROCEDURE — 83735 ASSAY OF MAGNESIUM: CPT | Performed by: INTERNAL MEDICINE

## 2021-06-22 PROCEDURE — 25010000003 LIDOCAINE 1 % SOLUTION: Performed by: INTERNAL MEDICINE

## 2021-06-22 PROCEDURE — 25010000002 HEPARIN (PORCINE) PER 1000 UNITS: Performed by: NURSE ANESTHETIST, CERTIFIED REGISTERED

## 2021-06-22 PROCEDURE — 25010000002 MIDAZOLAM PER 1 MG: Performed by: NURSE ANESTHETIST, CERTIFIED REGISTERED

## 2021-06-22 RX ORDER — SODIUM CHLORIDE 0.9 % (FLUSH) 0.9 %
3-10 SYRINGE (ML) INJECTION AS NEEDED
Status: DISCONTINUED | OUTPATIENT
Start: 2021-06-22 | End: 2021-06-22 | Stop reason: HOSPADM

## 2021-06-22 RX ORDER — HYDROCODONE BITARTRATE AND ACETAMINOPHEN 5; 325 MG/1; MG/1
1 TABLET ORAL EVERY 4 HOURS PRN
Status: DISCONTINUED | OUTPATIENT
Start: 2021-06-22 | End: 2021-06-22 | Stop reason: HOSPADM

## 2021-06-22 RX ORDER — MIDAZOLAM HYDROCHLORIDE 1 MG/ML
INJECTION INTRAMUSCULAR; INTRAVENOUS AS NEEDED
Status: DISCONTINUED | OUTPATIENT
Start: 2021-06-22 | End: 2021-06-22 | Stop reason: SURG

## 2021-06-22 RX ORDER — METOPROLOL SUCCINATE 50 MG/1
25 TABLET, EXTENDED RELEASE ORAL DAILY
COMMUNITY

## 2021-06-22 RX ORDER — LIDOCAINE HYDROCHLORIDE 10 MG/ML
INJECTION, SOLUTION INFILTRATION; PERINEURAL AS NEEDED
Status: DISCONTINUED | OUTPATIENT
Start: 2021-06-22 | End: 2021-06-22 | Stop reason: HOSPADM

## 2021-06-22 RX ORDER — ONDANSETRON 2 MG/ML
4 INJECTION INTRAMUSCULAR; INTRAVENOUS EVERY 6 HOURS PRN
Status: DISCONTINUED | OUTPATIENT
Start: 2021-06-22 | End: 2021-06-22 | Stop reason: HOSPADM

## 2021-06-22 RX ORDER — ACETAMINOPHEN 325 MG/1
650 TABLET ORAL EVERY 4 HOURS PRN
Status: DISCONTINUED | OUTPATIENT
Start: 2021-06-22 | End: 2021-06-22 | Stop reason: HOSPADM

## 2021-06-22 RX ORDER — SODIUM CHLORIDE 9 MG/ML
INJECTION, SOLUTION INTRAVENOUS CONTINUOUS PRN
Status: DISCONTINUED | OUTPATIENT
Start: 2021-06-22 | End: 2021-06-22 | Stop reason: SURG

## 2021-06-22 RX ORDER — ACETAMINOPHEN 650 MG/1
650 SUPPOSITORY RECTAL EVERY 4 HOURS PRN
Status: DISCONTINUED | OUTPATIENT
Start: 2021-06-22 | End: 2021-06-22 | Stop reason: HOSPADM

## 2021-06-22 RX ORDER — PHENYLEPHRINE HCL IN 0.9% NACL 1 MG/10 ML
SYRINGE (ML) INTRAVENOUS AS NEEDED
Status: DISCONTINUED | OUTPATIENT
Start: 2021-06-22 | End: 2021-06-22 | Stop reason: SURG

## 2021-06-22 RX ORDER — EPHEDRINE SULFATE 50 MG/ML
INJECTION INTRAVENOUS AS NEEDED
Status: DISCONTINUED | OUTPATIENT
Start: 2021-06-22 | End: 2021-06-22 | Stop reason: SURG

## 2021-06-22 RX ORDER — LOSARTAN POTASSIUM 50 MG/1
50 TABLET ORAL DAILY
COMMUNITY
End: 2021-07-16 | Stop reason: ALTCHOICE

## 2021-06-22 RX ORDER — SODIUM CHLORIDE 0.9 % (FLUSH) 0.9 %
3 SYRINGE (ML) INJECTION EVERY 12 HOURS SCHEDULED
Status: DISCONTINUED | OUTPATIENT
Start: 2021-06-22 | End: 2021-06-22 | Stop reason: HOSPADM

## 2021-06-22 RX ORDER — HEPARIN SODIUM 1000 [USP'U]/ML
INJECTION, SOLUTION INTRAVENOUS; SUBCUTANEOUS AS NEEDED
Status: DISCONTINUED | OUTPATIENT
Start: 2021-06-22 | End: 2021-06-22 | Stop reason: SURG

## 2021-06-22 RX ORDER — PROTAMINE SULFATE 10 MG/ML
INJECTION, SOLUTION INTRAVENOUS AS NEEDED
Status: DISCONTINUED | OUTPATIENT
Start: 2021-06-22 | End: 2021-06-22 | Stop reason: SURG

## 2021-06-22 RX ORDER — FENTANYL CITRATE 50 UG/ML
INJECTION, SOLUTION INTRAMUSCULAR; INTRAVENOUS AS NEEDED
Status: DISCONTINUED | OUTPATIENT
Start: 2021-06-22 | End: 2021-06-22 | Stop reason: SURG

## 2021-06-22 RX ADMIN — Medication 50 MCG: at 08:50

## 2021-06-22 RX ADMIN — PROTAMINE SULFATE 30 MG: 10 INJECTION, SOLUTION INTRAVENOUS at 09:35

## 2021-06-22 RX ADMIN — SODIUM CHLORIDE: 0.9 INJECTION, SOLUTION INTRAVENOUS at 08:21

## 2021-06-22 RX ADMIN — Medication 100 MCG: at 08:52

## 2021-06-22 RX ADMIN — Medication 100 MCG: at 08:54

## 2021-06-22 RX ADMIN — Medication 100 MCG: at 09:03

## 2021-06-22 RX ADMIN — EPHEDRINE SULFATE 10 MG: 50 INJECTION INTRAVENOUS at 08:50

## 2021-06-22 RX ADMIN — MIDAZOLAM 2 MG: 1 INJECTION INTRAMUSCULAR; INTRAVENOUS at 08:24

## 2021-06-22 RX ADMIN — CEFAZOLIN SODIUM 2 G: 1 INJECTION, POWDER, FOR SOLUTION INTRAMUSCULAR; INTRAVENOUS at 08:30

## 2021-06-22 RX ADMIN — EPHEDRINE SULFATE 10 MG: 50 INJECTION INTRAVENOUS at 08:46

## 2021-06-22 RX ADMIN — PROPOFOL 75 MCG/KG/MIN: 10 INJECTION, EMULSION INTRAVENOUS at 08:27

## 2021-06-22 RX ADMIN — HEPARIN SODIUM 5000 UNITS: 1000 INJECTION, SOLUTION INTRAVENOUS; SUBCUTANEOUS at 09:11

## 2021-06-22 RX ADMIN — FENTANYL CITRATE 25 MCG: 50 INJECTION, SOLUTION INTRAMUSCULAR; INTRAVENOUS at 08:27

## 2021-06-22 NOTE — ANESTHESIA PREPROCEDURE EVALUATION
Anesthesia Evaluation     Patient summary reviewed and Nursing notes reviewed   no history of anesthetic complications:  NPO Solid Status: > 8 hours  NPO Liquid Status: > 8 hours           Airway   Dental      Pulmonary    (+) a smoker Former,   Cardiovascular     ECG reviewed  PT is on anticoagulation therapy  Patient on routine beta blocker    (+) pacemaker pacemaker, hypertension, valvular problems/murmurs, past MI , CAD, CABG, dysrhythmias Atrial Fib, Atrial Flutter, CHF Systolic <55%, hyperlipidemia,       Neuro/Psych  GI/Hepatic/Renal/Endo    (+)   renal disease CRI,     Musculoskeletal     Abdominal    Substance History      OB/GYN          Other   autoimmune disease rheumatoid arthritis, blood dyscrasia anemia,   history of cancer    ROS/Med Hx Other: Renal insufficiency, sick sinus syndrome, leukemia, bronchitis    H/O COVID    Echo  Interpretation  There is a prosthetic mitral valve.  Gradients are abnormal for this prosthetic mitral valve.  Left ventricular systolic function is mildly reduced.  Ejection Fraction is 45-50%  There is mild global hypokinesis of the left ventricle.  Apical wall motion abnormality may reflect pacemaker activation.  Right ventricular systolic pressure is elevated at 40-50mmHg.    PSH  PROSTATE SURGERY BREAST BIOPSY  CARDIAC CATHETERIZATION LIDIA  EP STUDY CORONARY ARTERY BYPASS GRAFT  MITRAL VALVE REPLACEMENT ARM NERVE EXPLORATION  PACEMAKER IMPLANTATION CARDIAC SURGERY                  Anesthesia Plan    ASA 4     MAC   (Patient identified; pre-operative vital signs, all relevant labs/studies, complete medical/surgical/anesthetic history, full medication list, full allergy list, and NPO status obtained/reviewed; physical assessment performed; anesthetic options, side effects, potential complications, risks, and benefits discussed; questions answered; written anesthesia consent obtained; patient cleared for procedure; anesthesia machine and equipment checked and  functioning)    Anesthetic plan, all risks, benefits, and alternatives have been provided, discussed and informed consent has been obtained with: patient.    Plan discussed with CRNA and CAA.

## 2021-06-22 NOTE — H&P
CC--atrial fibrillation, hypertension      Sub--76-year-old male patient having recurrent sustained atrial flutter and  atrial fibrillation Underwent LIDIA which showed appropriately functioning bioprosthetic mitral valve with trace mitral regurgitation and left atrial appendage ligation and atrial fibrillation with atrial flutter and was cardioverted and left on sotalol and  underwent ablation  several months ago-- recurrent atrial arrhythmias were noted and patient was placed on amiodarone nearly a near-- in the interim patient has developed recurrent class 3 systolic heart failure with EF of 45%-- continuous RV pacing with iatrogenic left bundle-branch block was noted-- mildly elevated tsh was noted and a repeat cardiac catheterization back in October of 2018 and left on medical management -- comorbidities include coronary artery disease, valvular heart disease, atrial fibrillation, and history of leukemia.   He is status post previous bioprosthetic mitral valve replacement as well as 2 vessel CABG. He has history of sick sinus syndrome and is status post permanent pacemaker implant.He has history of coronary artery disease status post PCI stenting and subsequently underwent EECP.   Patient underwent biventricular pacemaker upgrade which was  reprogrammed because of diaphragmatic stimulation and comes back with symptoms of recurrent palpitations and  rapid heart rate for evaluation and denies any chest pain or syncope and significant improvement of symptoms are noted after biventricular pacemaker upgrade--patient was reprogrammed with last visit and did not experience any more diaphragmatic stimulation and is slightly confused with medications because [facility] --he remains in functional class II and feels remarkably well                 Past Medical History:   Diagnosis Date   • Bronchitis     • CAD (coronary artery disease)       S/P CABG   • Hyperlipidemia     • Hypertension     • Leukemia (CMS/HCC)        Lymphoma : non - [facility] - has CLL    • Myocardial infarction (CMS/HCC) 07/2016     slight   • Paroxysmal atrial fibrillation (CMS/HCC)     • Prostate CA (CMS/HCC)     • SSS (sick sinus syndrome) (CMS/HCC)       S/P Pacemaker implantation   • Valvular heart disease       S/P MVR            Past Surgical History:   Procedure Laterality Date   • ARM NERVE EXPLORATION Right 2013     Arm Surgery for nerve problem   • BREAST BIOPSY Left 05/2016   • CARDIAC CATHETERIZATION   10/05/2018     @ [facility] ; 07/25/2016 @ [facility]   • CARDIAC SURGERY       • CORONARY ARTERY BYPASS GRAFT       • EP STUDY   11/2017   • MITRAL VALVE REPLACEMENT   12/2002   • PACEMAKER IMPLANTATION   11/07/2011     Pacemaker: Biotronik Dual chamber    • PROSTATE SURGERY   03/2016   • LIDIA   11/2017         Review of Systems   General: Negative for fatigue and tiredness  Eyes: No redness  Cardiovascular: No chest pain, positive for palpitations           Physical Exam  VITALS REVIEWEDGeneral:      well developed, well nourished, in no acute distress.    Head:      normocephalic and atraumatic.    Eyes:      PERRL/EOM intact, conjunctiva and sclera clear with out nystagmus.    Neck:      no masses, thyromegaly,  trachea central with normal respiratory effort and PMI displaced laterally  Lungs:      clear bilaterally to auscultation.    Heart:     Paced rhythm and pacemaker site is clean with underlying atrial fibrillation without any murmurs gallops or rubs           ssessment plan      persistent atrial flutter-- driven by  paroxysmal atrial fibrillation with recurrence despite cardioversion and  amiodarone and failed sotalol  despite extensive ablation  Biventricular pacemaker in situ--- pacemaker personally reprogrammed  without any diaphragmatic stimulation --intermittent  rapidly conducting atrial fibrillation--AV node ablation to be scheduled for AV node ablation to optimize CRT pacing and avoid multiple medications for A. fib rate control    progressive class 3 systolic heart failure symptoms with EF of 45%--significant reduction of heart failure symptoms and back to functional class II after biventricular pacing   Hypertension controlled  Medications reviewed and follow-up appointments made  Currently note patient's left atrial appendage is ligated and would not need anticoagulation  Orders placed for AV node ablation  Recent echo from [facility] revealed EF of 50% with moderate to severe pulmonary hypertension which is reviewed on our healthcare records  Latest  hemoglobin of 10.1 g with normal platelets

## 2021-06-22 NOTE — DISCHARGE INSTRUCTIONS
Post Cath Instructions      Call Dr. Nieto’s office to schedule a follow up appointment at [phone].  Specific Physician Instructions: ***    1) Drink plenty of fluids for the next 24 hours.  This helps to eliminate the dye used in your procedure through urination.  You may resume a normal diet; however, try to avoid foods that would cause gas or constipation.    2) Sedative medication given to you during your catheterization may decrease your judgement and reaction time for up to 24-48 hours.  Therefore:  a. DO NOT drive or operate hazardous machinery (48 hours)  b. DO NOT consume alcoholic beverages  c. DO NOT make any important/legal decisions  d. Have someone stay with you for at least 24 hours    3) To allow proper healing and prevent bleeding, the following activities are to be strictly avoided for the next 24-48 hours:  a. Excessive bending at wound site  b. Straining (anything that would tense up muscles around the affected puncture site)  c. Lifting objects greater than 5 pounds, pushing, or pulling for 5 days  i. For Groin Cases:  1. Refrain from sexual activity  2. Refrain from running or vigorous walking  3. No prolonged sitting or standing  4. Limit stair climbing as much as possible    4) Keep the puncture site clean and dry.  You may remove the dressing tomorrow and replace it with a band-aid for at least one additional day.  Gently clean the site with mild soap and water.  No scrubbing/rubbing and lightly pat the area dry.  Showers are acceptable; however, avoid submerging in water (tub baths, hot tubs, swimming pools, dishwater, etc…) for at least one week.  The site should be completely healed before resuming these activities to reduce the risk of infection.  Check the site often.  Watch for signs and symptoms of infection and notify your physician if any of the following occur:  a. Bleeding or an increase in swelling at the puncture site  b. Fever  c. Increased soreness around puncture  site  d. Foul odor or significant drainage from the puncture site  e. Swelling, redness, or warmth at the puncture site    **A bruise or small “pea sized” lump under the skin at the puncture site is not unusual.  This should disappear within 3-4 weeks.**  5) CONTACT YOUR PHYSICIAN OR CALL 911 IF YOU EXPERIENCE ANY OF THE FOLLOWING:  a. Increased angina (chest pain) or frequent sensations of pressure, burning, pain, or other discomfort in the chest, arm, jaws, or stomach  b. Lightheadedness, dizziness, faint feeling, sweating, or difficulty breathing  c. Odd sensation changes like numbness, tingling, coldness, or pain in the arm or leg in which the catheter was inserted  d. Limb in which the catheter was inserted becomes pale/bluish in color    IMPORTANT:  Although this occurs very rarely, if you should develop bright red or excessive bleeding, feel a “pop” inside at the insertion site, or notice a sudden increase in swelling larger than a walnut, you should call 911.  Hold continuous firm pressure to the access site until emergency personnel arrive.  It is best if someone else can do this for you.

## 2021-06-22 NOTE — ANESTHESIA POSTPROCEDURE EVALUATION
Patient: Kevin Smith    Procedure Summary     Date: 06/22/21 Room / Location: [facility]ATH [facility] 3 / [facility] CATH INVASIVE LOCATION    Anesthesia Start: 0820 Anesthesia Stop: 0953    Procedure: AV node ablation (N/A ) Diagnosis:       Persistent atrial fibrillation (CMS/HCC)      Presence of biventricular cardiac pacemaker      Chronic systolic congestive heart failure (CMS/HCC)      (Post AV node ablation without complications)    Providers: Luis Nieto MD Provider: Caleb Link MD    Anesthesia Type: MAC ASA Status: 4          Anesthesia Type: MAC    Vitals  Vitals Value Taken Time   /60 06/22/21 1231   Temp     Pulse 71 06/22/21 1245   Resp 19 06/22/21 1130   SpO2 93 % 06/22/21 1245   Vitals shown include unvalidated device data.        Post Anesthesia Care and Evaluation    Patient location during evaluation: PACU  Patient participation: complete - patient participated  Level of consciousness: awake  Pain scale: See nurse's notes for pain score.  Pain management: adequate  Airway patency: patent  Anesthetic complications: No anesthetic complications  PONV Status: none  Cardiovascular status: acceptable  Respiratory status: acceptable  Hydration status: acceptable    Comments: Patient seen and examined postoperatively; vital signs stable; SpO2 greater than or equal to 90%; cardiopulmonary status stable; nausea/vomiting adequately controlled; pain adequately controlled; no apparent anesthesia complications; patient discharged from anesthesia care when discharge criteria were met

## 2021-07-16 ENCOUNTER — OFFICE VISIT (OUTPATIENT)
Dept: CARDIOLOGY | Facility: CLINIC | Age: 76
End: 2021-07-16

## 2021-07-16 VITALS
SYSTOLIC BLOOD PRESSURE: 107 MMHG | BODY MASS INDEX: 25.38 KG/M2 | HEIGHT: 72 IN | HEART RATE: 69 BPM | DIASTOLIC BLOOD PRESSURE: 60 MMHG | WEIGHT: 187.4 LBS | OXYGEN SATURATION: 98 %

## 2021-07-16 DIAGNOSIS — I50.22 CHRONIC SYSTOLIC CONGESTIVE HEART FAILURE (HCC): ICD-10-CM

## 2021-07-16 DIAGNOSIS — I25.810 CORONARY ARTERY DISEASE INVOLVING CORONARY BYPASS GRAFT OF NATIVE HEART WITHOUT ANGINA PECTORIS: ICD-10-CM

## 2021-07-16 DIAGNOSIS — I48.19 PERSISTENT ATRIAL FIBRILLATION (HCC): Primary | ICD-10-CM

## 2021-07-16 DIAGNOSIS — Z95.0 PRESENCE OF BIVENTRICULAR CARDIAC PACEMAKER: ICD-10-CM

## 2021-07-16 DIAGNOSIS — Z95.1 S/P CABG (CORONARY ARTERY BYPASS GRAFT): ICD-10-CM

## 2021-07-16 PROCEDURE — 93281 PM DEVICE PROGR EVAL MULTI: CPT | Performed by: INTERNAL MEDICINE

## 2021-07-16 PROCEDURE — 99214 OFFICE O/P EST MOD 30 MIN: CPT | Performed by: INTERNAL MEDICINE

## 2021-07-16 PROCEDURE — 93000 ELECTROCARDIOGRAM COMPLETE: CPT | Performed by: INTERNAL MEDICINE

## 2021-07-16 NOTE — PROGRESS NOTES
C--atrial fibrillation, hypertension     Sub--76-year-old male patient having recurrent sustained atrial flutter and  atrial fibrillation Underwent LIDIA which showed appropriately functioning bioprosthetic mitral valve with trace mitral regurgitation and left atrial appendage ligation and atrial fibrillation with atrial flutter and was cardioverted and left on sotalol and  underwent ablation  several months ago-- recurrent atrial arrhythmias were noted and patient was placed on amiodarone nearly a near-- in the interim patient has developed recurrent class 3 systolic heart failure with EF of 45%-- continuous RV pacing with iatrogenic left bundle-branch block was noted-- mildly elevated tsh was noted and a repeat cardiac catheterization back in October of 2018 and left on medical management -- comorbidities include coronary artery disease, valvular heart disease, atrial fibrillation, and history of leukemia.   He is status post previous bioprosthetic mitral valve replacement as well as 2 vessel CABG. He has history of sick sinus syndrome and is status post permanent pacemaker implant.He has history of coronary artery disease status post PCI stenting and subsequently underwent EECP.   Patient underwent biventricular pacemaker upgrade which was  reprogrammed because of diaphragmatic stimulation and comes back with symptoms of recurrent palpitations and  rapid heart rate for evaluation and denies any chest pain or syncope and significant improvement of symptoms are noted after biventricular pacemaker upgrade--patient was reprogrammed with last visit and did not experience any more diaphragmatic stimulation and is slightly confused with medications because [facility] also [facility] --he remains in functional class II and feels remarkably well   Underwent AV node ablation and feels remarkably well after AV node ablation.  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    • Myocardial infarction (CMS/HCC) 07/2016    slight   • Paroxysmal atrial fibrillation (CMS/HCC)    • Prostate CA (CMS/HCC)    • SSS (sick sinus syndrome) (CMS/HCC)     S/P Pacemaker implantation   • Valvular heart disease     S/P MVR     Past Surgical History:   Procedure Laterality Date   • ARM NERVE EXPLORATION Right 2013    Arm Surgery for nerve problem   • BREAST BIOPSY Left 05/2016   • CARDIAC CATHETERIZATION  10/05/2018    @ [facility] ; 07/25/2016 @ [facility]   • CARDIAC SURGERY     • CORONARY ARTERY BYPASS GRAFT     • EP STUDY  11/2017   • MITRAL VALVE REPLACEMENT  12/2002   • PACEMAKER IMPLANTATION  11/07/2011    Pacemaker: Biotronik Dual chamber    • PROSTATE SURGERY  03/2016   • LIDIA  11/2017       Review of Systems   General: Negative for fatigue and tiredness  Eyes: No redness  Cardiovascular: No chest pain, positive for palpitations        Physical Exam  VITALS REVIEWEDGeneral:      well developed, well nourished, in no acute distress.    Head:      normocephalic and atraumatic.    Eyes:      PERRL/EOM intact, conjunctiva and sclera clear with out nystagmus.    Neck:      no masses, thyromegaly,  trachea central with normal respiratory effort and PMI displaced laterally  Lungs:      clear bilaterally to auscultation.    Heart:     Paced rhythm and pacemaker site is clean with underlying atrial fibrillation without any murmurs gallops or rubs    Severe subcutaneous bleeding noted        ssessment plan     persistent atrial flutter-- driven by  paroxysmal atrial fibrillation with recurrence despite cardioversion and  amiodarone and failed sotalol  despite extensive ablation  Biventricular pacemaker in situ--- pacemaker personally reprogrammed  without any diaphragmatic stimulation --post AV node ablation and patient is completely paced  dependent and LV to RV offset changed by 20 ms for optimization   Severe subcutaneous bleeding--stop aspirin and continue Plavix   progressive class 3 systolic heart failure symptoms with EF of 45%--significant reduction of heart failure symptoms and back to functional class II after biventricular pacing   Hypertension controlled  Medications reviewed and follow-up appointments made  Currently note patient's left atrial appendage is ligated and would not need anticoagulation      ECG 12 Lead    Date/Time: 7/16/2021 10:19 AM  Performed by: Luis Nieto MD  Authorized by: Luis Nieto MD   Comparison: compared with previous ECG   Similar to previous ECG  Rhythm: atrial fibrillation and paced  Rate: normal            Electronically signed by Luis Nieto MD, 07/16/21, 10:19 AM EDT.

## (undated) DEVICE — ST ACC MICROPUNCTURE STFF/CANN PLAT/TP 4F 21G 40CM

## (undated) DEVICE — PAD E/S GRND SGL/FOIL 9FT/CORD DISP

## (undated) DEVICE — Device: Brand: EZ STEER NAV DS

## (undated) DEVICE — TBG PRESS/MONITOR FIX M/F LL A/ 24IN STRL

## (undated) DEVICE — Device: Brand: REFERENCE PATCH CARTO 3

## (undated) DEVICE — ELECTRD DEFIB M/FUNC PROPADZ RADIOL 2PK

## (undated) DEVICE — PINNACLE INTRODUCER SHEATH: Brand: PINNACLE

## (undated) DEVICE — PK TRY HEART CATH 50

## (undated) DEVICE — Device: Brand: CARTO 3